# Patient Record
Sex: FEMALE | Race: WHITE | HISPANIC OR LATINO | URBAN - METROPOLITAN AREA
[De-identification: names, ages, dates, MRNs, and addresses within clinical notes are randomized per-mention and may not be internally consistent; named-entity substitution may affect disease eponyms.]

---

## 2017-02-07 ENCOUNTER — INPATIENT (INPATIENT)
Facility: HOSPITAL | Age: 37
LOS: 0 days | Discharge: HOME | DRG: 343 | End: 2017-02-08
Attending: SURGERY | Admitting: SURGERY
Payer: COMMERCIAL

## 2017-02-07 VITALS
DIASTOLIC BLOOD PRESSURE: 54 MMHG | WEIGHT: 123.02 LBS | TEMPERATURE: 97 F | HEART RATE: 97 BPM | HEIGHT: 65 IN | SYSTOLIC BLOOD PRESSURE: 91 MMHG | RESPIRATION RATE: 18 BRPM | OXYGEN SATURATION: 98 %

## 2017-02-07 DIAGNOSIS — Z98.82 BREAST IMPLANT STATUS: Chronic | ICD-10-CM

## 2017-02-07 DIAGNOSIS — K35.80 UNSPECIFIED ACUTE APPENDICITIS: ICD-10-CM

## 2017-02-07 DIAGNOSIS — Z98.891 HISTORY OF UTERINE SCAR FROM PREVIOUS SURGERY: Chronic | ICD-10-CM

## 2017-02-07 LAB
ACETONE SERPL-MCNC: NEGATIVE — SIGNIFICANT CHANGE UP
ALBUMIN SERPL ELPH-MCNC: 3.4 G/DL — LOW (ref 3.5–5)
ALP SERPL-CCNC: 65 U/L — SIGNIFICANT CHANGE UP (ref 40–120)
ALT FLD-CCNC: 22 U/L DA — SIGNIFICANT CHANGE UP (ref 10–60)
ANION GAP SERPL CALC-SCNC: 6 MMOL/L — SIGNIFICANT CHANGE UP (ref 5–17)
APTT BLD: 28.1 SEC — SIGNIFICANT CHANGE UP (ref 27.5–37.4)
AST SERPL-CCNC: 21 U/L — SIGNIFICANT CHANGE UP (ref 10–40)
BASOPHILS # BLD AUTO: 0 K/UL — SIGNIFICANT CHANGE UP (ref 0–0.2)
BASOPHILS NFR BLD AUTO: 0.7 % — SIGNIFICANT CHANGE UP (ref 0–2)
BILIRUB SERPL-MCNC: 0.2 MG/DL — SIGNIFICANT CHANGE UP (ref 0.2–1.2)
BUN SERPL-MCNC: 9 MG/DL — SIGNIFICANT CHANGE UP (ref 7–18)
CALCIUM SERPL-MCNC: 8.4 MG/DL — SIGNIFICANT CHANGE UP (ref 8.4–10.5)
CHLORIDE SERPL-SCNC: 106 MMOL/L — SIGNIFICANT CHANGE UP (ref 96–108)
CO2 SERPL-SCNC: 28 MMOL/L — SIGNIFICANT CHANGE UP (ref 22–31)
CREAT SERPL-MCNC: 0.8 MG/DL — SIGNIFICANT CHANGE UP (ref 0.5–1.3)
EOSINOPHIL # BLD AUTO: 0.1 K/UL — SIGNIFICANT CHANGE UP (ref 0–0.5)
EOSINOPHIL NFR BLD AUTO: 1.9 % — SIGNIFICANT CHANGE UP (ref 0–6)
GLUCOSE SERPL-MCNC: 96 MG/DL — SIGNIFICANT CHANGE UP (ref 70–99)
HCG SERPL-ACNC: <1 MIU/ML — SIGNIFICANT CHANGE UP
HCT VFR BLD CALC: 38.5 % — SIGNIFICANT CHANGE UP (ref 34.5–45)
HGB BLD-MCNC: 12.6 G/DL — SIGNIFICANT CHANGE UP (ref 11.5–15.5)
INR BLD: 1.27 RATIO — HIGH (ref 0.88–1.16)
LIDOCAIN IGE QN: 112 U/L — SIGNIFICANT CHANGE UP (ref 73–393)
LYMPHOCYTES # BLD AUTO: 1 K/UL — SIGNIFICANT CHANGE UP (ref 1–3.3)
LYMPHOCYTES # BLD AUTO: 16.1 % — SIGNIFICANT CHANGE UP (ref 13–44)
MCHC RBC-ENTMCNC: 29.9 PG — SIGNIFICANT CHANGE UP (ref 27–34)
MCHC RBC-ENTMCNC: 32.8 GM/DL — SIGNIFICANT CHANGE UP (ref 32–36)
MCV RBC AUTO: 91.3 FL — SIGNIFICANT CHANGE UP (ref 80–100)
MONOCYTES # BLD AUTO: 0.9 K/UL — SIGNIFICANT CHANGE UP (ref 0–0.9)
MONOCYTES NFR BLD AUTO: 14.7 % — HIGH (ref 2–14)
NEUTROPHILS # BLD AUTO: 4.3 K/UL — SIGNIFICANT CHANGE UP (ref 1.8–7.4)
NEUTROPHILS NFR BLD AUTO: 66.6 % — SIGNIFICANT CHANGE UP (ref 43–77)
PLATELET # BLD AUTO: 208 K/UL — SIGNIFICANT CHANGE UP (ref 150–400)
POTASSIUM SERPL-MCNC: 4.3 MMOL/L — SIGNIFICANT CHANGE UP (ref 3.5–5.3)
POTASSIUM SERPL-SCNC: 4.3 MMOL/L — SIGNIFICANT CHANGE UP (ref 3.5–5.3)
PROT SERPL-MCNC: 7.2 G/DL — SIGNIFICANT CHANGE UP (ref 6–8.3)
PROTHROM AB SERPL-ACNC: 14.3 SEC — HIGH (ref 10–13.1)
RBC # BLD: 4.22 M/UL — SIGNIFICANT CHANGE UP (ref 3.8–5.2)
RBC # FLD: 14.2 % — SIGNIFICANT CHANGE UP (ref 10.3–14.5)
SODIUM SERPL-SCNC: 140 MMOL/L — SIGNIFICANT CHANGE UP (ref 135–145)
WBC # BLD: 6.5 K/UL — SIGNIFICANT CHANGE UP (ref 3.8–10.5)
WBC # FLD AUTO: 6.5 K/UL — SIGNIFICANT CHANGE UP (ref 3.8–10.5)

## 2017-02-07 PROCEDURE — 74177 CT ABD & PELVIS W/CONTRAST: CPT | Mod: 26

## 2017-02-07 PROCEDURE — 99285 EMERGENCY DEPT VISIT HI MDM: CPT

## 2017-02-07 PROCEDURE — 44970 LAPAROSCOPY APPENDECTOMY: CPT

## 2017-02-07 PROCEDURE — 99253 IP/OBS CNSLTJ NEW/EST LOW 45: CPT | Mod: 57

## 2017-02-07 PROCEDURE — 88304 TISSUE EXAM BY PATHOLOGIST: CPT | Mod: 26

## 2017-02-07 PROCEDURE — 44970 LAPAROSCOPY APPENDECTOMY: CPT | Mod: AS

## 2017-02-07 RX ORDER — PANTOPRAZOLE SODIUM 20 MG/1
40 TABLET, DELAYED RELEASE ORAL
Qty: 0 | Refills: 0 | Status: DISCONTINUED | OUTPATIENT
Start: 2017-02-07 | End: 2017-02-08

## 2017-02-07 RX ORDER — ATAZANAVIR 200 MG/1
300 CAPSULE ORAL DAILY
Qty: 0 | Refills: 0 | Status: DISCONTINUED | OUTPATIENT
Start: 2017-02-07 | End: 2017-02-07

## 2017-02-07 RX ORDER — ENOXAPARIN SODIUM 100 MG/ML
40 INJECTION SUBCUTANEOUS EVERY 24 HOURS
Qty: 0 | Refills: 0 | Status: DISCONTINUED | OUTPATIENT
Start: 2017-02-07 | End: 2017-02-08

## 2017-02-07 RX ORDER — HYDROMORPHONE HYDROCHLORIDE 2 MG/ML
0.5 INJECTION INTRAMUSCULAR; INTRAVENOUS; SUBCUTANEOUS
Qty: 0 | Refills: 0 | Status: DISCONTINUED | OUTPATIENT
Start: 2017-02-07 | End: 2017-02-07

## 2017-02-07 RX ORDER — RITONAVIR 100 MG/1
100 TABLET, FILM COATED ORAL
Qty: 0 | Refills: 0 | Status: DISCONTINUED | OUTPATIENT
Start: 2017-02-07 | End: 2017-02-07

## 2017-02-07 RX ORDER — EFAVIRENZ, EMTRICITABINE AND TENOFOVIR DISOPROXIL FUMARATE 600; 200; 300 MG/1; MG/1; MG/1
1 TABLET, FILM COATED ORAL AT BEDTIME
Qty: 0 | Refills: 0 | Status: DISCONTINUED | OUTPATIENT
Start: 2017-02-07 | End: 2017-02-08

## 2017-02-07 RX ORDER — SODIUM CHLORIDE 9 MG/ML
1000 INJECTION, SOLUTION INTRAVENOUS
Qty: 0 | Refills: 0 | Status: DISCONTINUED | OUTPATIENT
Start: 2017-02-07 | End: 2017-02-08

## 2017-02-07 RX ORDER — ONDANSETRON 8 MG/1
4 TABLET, FILM COATED ORAL EVERY 6 HOURS
Qty: 0 | Refills: 0 | Status: DISCONTINUED | OUTPATIENT
Start: 2017-02-07 | End: 2017-02-08

## 2017-02-07 RX ORDER — ONDANSETRON 8 MG/1
4 TABLET, FILM COATED ORAL ONCE
Qty: 0 | Refills: 0 | Status: DISCONTINUED | OUTPATIENT
Start: 2017-02-07 | End: 2017-02-07

## 2017-02-07 RX ORDER — MORPHINE SULFATE 50 MG/1
2 CAPSULE, EXTENDED RELEASE ORAL EVERY 4 HOURS
Qty: 0 | Refills: 0 | Status: DISCONTINUED | OUTPATIENT
Start: 2017-02-07 | End: 2017-02-07

## 2017-02-07 RX ORDER — LEVOTHYROXINE SODIUM 125 MCG
150 TABLET ORAL DAILY
Qty: 0 | Refills: 0 | Status: DISCONTINUED | OUTPATIENT
Start: 2017-02-07 | End: 2017-02-08

## 2017-02-07 RX ORDER — SODIUM CHLORIDE 9 MG/ML
3 INJECTION INTRAMUSCULAR; INTRAVENOUS; SUBCUTANEOUS ONCE
Qty: 0 | Refills: 0 | Status: COMPLETED | OUTPATIENT
Start: 2017-02-07 | End: 2017-02-07

## 2017-02-07 RX ORDER — SODIUM CHLORIDE 9 MG/ML
1000 INJECTION INTRAMUSCULAR; INTRAVENOUS; SUBCUTANEOUS
Qty: 0 | Refills: 0 | Status: DISCONTINUED | OUTPATIENT
Start: 2017-02-07 | End: 2017-02-08

## 2017-02-07 RX ORDER — EFAVIRENZ, EMTRICITABINE AND TENOFOVIR DISOPROXIL FUMARATE 600; 200; 300 MG/1; MG/1; MG/1
1 TABLET, FILM COATED ORAL ONCE
Qty: 0 | Refills: 0 | Status: COMPLETED | OUTPATIENT
Start: 2017-02-07 | End: 2017-02-08

## 2017-02-07 RX ORDER — CEFOTETAN DISODIUM 1 G
1 VIAL (EA) INJECTION ONCE
Qty: 0 | Refills: 0 | Status: COMPLETED | OUTPATIENT
Start: 2017-02-07 | End: 2017-02-07

## 2017-02-07 RX ADMIN — HYDROMORPHONE HYDROCHLORIDE 0.5 MILLIGRAM(S): 2 INJECTION INTRAMUSCULAR; INTRAVENOUS; SUBCUTANEOUS at 19:46

## 2017-02-07 RX ADMIN — SODIUM CHLORIDE 3 MILLILITER(S): 9 INJECTION INTRAMUSCULAR; INTRAVENOUS; SUBCUTANEOUS at 12:06

## 2017-02-07 RX ADMIN — SODIUM CHLORIDE 200 MILLILITER(S): 9 INJECTION INTRAMUSCULAR; INTRAVENOUS; SUBCUTANEOUS at 12:06

## 2017-02-07 RX ADMIN — HYDROMORPHONE HYDROCHLORIDE 0.5 MILLIGRAM(S): 2 INJECTION INTRAMUSCULAR; INTRAVENOUS; SUBCUTANEOUS at 20:20

## 2017-02-07 NOTE — H&P ADULT. - PROBLEM SELECTOR PLAN 1
admit to surgery, Dr Vance to take the pt for a laparoscopic appendectomy tonight. informed consent signed. pre op labs ordered

## 2017-02-07 NOTE — H&P ADULT. - HISTORY OF PRESENT ILLNESS
36 year old female with hypothyroid and HIV+ pmhx presents c/o right sided abd pain x2 days. She has had some mild nausea and 1 episode of vomitting. She has had fevers at home up to 101.7*F. she also has a history of right sided ovarian cysts that have never given her problems in the past

## 2017-02-07 NOTE — ED PROVIDER NOTE - CHPI ED SYMPTOMS NEG
no diarrhea/no blood in stool/no vaginal bleeding, no vaginal discharge/no vomiting/no dysuria/no hematuria/no chills

## 2017-02-07 NOTE — ED PROVIDER NOTE - CARE PLAN
Principal Discharge DX:	Acute appendicitis Principal Discharge DX:	Acute appendicitis  Secondary Diagnosis:	Ovarian cyst

## 2017-02-07 NOTE — ED PROVIDER NOTE - OBJECTIVE STATEMENT
37 y/o F pt w/ PMHx of HIV and hypothyroid and PSHx of breast augmentation and  presents to the ED c/o low grade fever (Tmax 101F), chills and nausea x 2 days, and RLQ abdominal pain since yesterday. Pt describes sharp nonradiating pain that is constant and 7/10 in severity. Pt notes that she was told during her last pregnancy x 2 years ago that she had ovarian cysts. Pt states that her  sexually transmitted HIV to her and that they are currently sexually active w/o protection. Pt last had blood work done for HIV x 6 months ago and her CD4 was undetectable, as per pt. LMP: 2017. Last BM: yesterday morning. Pt denies chest pain, SOB, cough, vomiting, diarrhea, blood in stool, dysuria, hematuria, vaginal bleeding, vaginal discharge, or any other complaints. NKDA

## 2017-02-08 VITALS
TEMPERATURE: 99 F | HEART RATE: 89 BPM | SYSTOLIC BLOOD PRESSURE: 100 MMHG | DIASTOLIC BLOOD PRESSURE: 56 MMHG | RESPIRATION RATE: 18 BRPM | OXYGEN SATURATION: 100 %

## 2017-02-08 LAB
ANION GAP SERPL CALC-SCNC: 8 MMOL/L — SIGNIFICANT CHANGE UP (ref 5–17)
BUN SERPL-MCNC: 7 MG/DL — SIGNIFICANT CHANGE UP (ref 7–18)
CALCIUM SERPL-MCNC: 7.5 MG/DL — LOW (ref 8.4–10.5)
CHLORIDE SERPL-SCNC: 107 MMOL/L — SIGNIFICANT CHANGE UP (ref 96–108)
CO2 SERPL-SCNC: 24 MMOL/L — SIGNIFICANT CHANGE UP (ref 22–31)
CREAT SERPL-MCNC: 0.61 MG/DL — SIGNIFICANT CHANGE UP (ref 0.5–1.3)
GLUCOSE SERPL-MCNC: 85 MG/DL — SIGNIFICANT CHANGE UP (ref 70–99)
HCT VFR BLD CALC: 34.9 % — SIGNIFICANT CHANGE UP (ref 34.5–45)
HGB BLD-MCNC: 11 G/DL — LOW (ref 11.5–15.5)
MCHC RBC-ENTMCNC: 28.6 PG — SIGNIFICANT CHANGE UP (ref 27–34)
MCHC RBC-ENTMCNC: 31.4 GM/DL — LOW (ref 32–36)
MCV RBC AUTO: 91 FL — SIGNIFICANT CHANGE UP (ref 80–100)
PLATELET # BLD AUTO: 158 K/UL — SIGNIFICANT CHANGE UP (ref 150–400)
POTASSIUM SERPL-MCNC: 4 MMOL/L — SIGNIFICANT CHANGE UP (ref 3.5–5.3)
POTASSIUM SERPL-SCNC: 4 MMOL/L — SIGNIFICANT CHANGE UP (ref 3.5–5.3)
RBC # BLD: 3.84 M/UL — SIGNIFICANT CHANGE UP (ref 3.8–5.2)
RBC # FLD: 14 % — SIGNIFICANT CHANGE UP (ref 10.3–14.5)
SODIUM SERPL-SCNC: 139 MMOL/L — SIGNIFICANT CHANGE UP (ref 135–145)
WBC # BLD: 6.6 K/UL — SIGNIFICANT CHANGE UP (ref 3.8–10.5)
WBC # FLD AUTO: 6.6 K/UL — SIGNIFICANT CHANGE UP (ref 3.8–10.5)

## 2017-02-08 PROCEDURE — 99285 EMERGENCY DEPT VISIT HI MDM: CPT | Mod: 25

## 2017-02-08 PROCEDURE — 87086 URINE CULTURE/COLONY COUNT: CPT

## 2017-02-08 PROCEDURE — 87186 SC STD MICRODIL/AGAR DIL: CPT

## 2017-02-08 PROCEDURE — 82009 KETONE BODYS QUAL: CPT

## 2017-02-08 PROCEDURE — 80053 COMPREHEN METABOLIC PANEL: CPT

## 2017-02-08 PROCEDURE — 80048 BASIC METABOLIC PNL TOTAL CA: CPT

## 2017-02-08 PROCEDURE — 85027 COMPLETE CBC AUTOMATED: CPT

## 2017-02-08 PROCEDURE — 74177 CT ABD & PELVIS W/CONTRAST: CPT

## 2017-02-08 PROCEDURE — 85610 PROTHROMBIN TIME: CPT

## 2017-02-08 PROCEDURE — 84702 CHORIONIC GONADOTROPIN TEST: CPT

## 2017-02-08 PROCEDURE — 88304 TISSUE EXAM BY PATHOLOGIST: CPT

## 2017-02-08 PROCEDURE — 83690 ASSAY OF LIPASE: CPT

## 2017-02-08 PROCEDURE — 85730 THROMBOPLASTIN TIME PARTIAL: CPT

## 2017-02-08 RX ORDER — SODIUM CHLORIDE 9 MG/ML
500 INJECTION INTRAMUSCULAR; INTRAVENOUS; SUBCUTANEOUS ONCE
Qty: 0 | Refills: 0 | Status: COMPLETED | OUTPATIENT
Start: 2017-02-08 | End: 2017-02-08

## 2017-02-08 RX ORDER — KETOROLAC TROMETHAMINE 30 MG/ML
15 SYRINGE (ML) INJECTION EVERY 6 HOURS
Qty: 0 | Refills: 0 | Status: DISCONTINUED | OUTPATIENT
Start: 2017-02-08 | End: 2017-02-08

## 2017-02-08 RX ORDER — OXYCODONE HYDROCHLORIDE 5 MG/1
1 TABLET ORAL
Qty: 20 | Refills: 0 | OUTPATIENT
Start: 2017-02-08 | End: 2017-02-13

## 2017-02-08 RX ORDER — OXYCODONE HYDROCHLORIDE 5 MG/1
1 TABLET ORAL
Qty: 20 | Refills: 0
Start: 2017-02-08 | End: 2017-02-13

## 2017-02-08 RX ADMIN — Medication 15 MILLIGRAM(S): at 03:10

## 2017-02-08 RX ADMIN — EFAVIRENZ, EMTRICITABINE AND TENOFOVIR DISOPROXIL FUMARATE 1 TABLET(S): 600; 200; 300 TABLET, FILM COATED ORAL at 00:11

## 2017-02-08 RX ADMIN — ENOXAPARIN SODIUM 40 MILLIGRAM(S): 100 INJECTION SUBCUTANEOUS at 06:10

## 2017-02-08 RX ADMIN — Medication 150 MICROGRAM(S): at 06:10

## 2017-02-08 RX ADMIN — SODIUM CHLORIDE 250 MILLILITER(S): 9 INJECTION INTRAMUSCULAR; INTRAVENOUS; SUBCUTANEOUS at 03:12

## 2017-02-08 RX ADMIN — Medication 15 MILLIGRAM(S): at 03:30

## 2017-02-08 RX ADMIN — PANTOPRAZOLE SODIUM 40 MILLIGRAM(S): 20 TABLET, DELAYED RELEASE ORAL at 06:11

## 2017-02-08 NOTE — DISCHARGE NOTE ADULT - OTHER SIGNIFICANT FINDINGS
Patient ID: [id number] Patient Name: PADMINI DEWITT   YOB: 1980 Sex: F      EXAM: CT ABDOMEN AND PELVIS OC IC      PROCEDURE DATE: 02/07/2017      INTERPRETATION: CT of the abdomen and pelvis with IV contrast    Clinical Indication: Right lower quadrant abdominal pain and fever. History  of HIV.    Technique: Axial multidetector CT images of the abdomen and pelvis are  acquired following the administration of oral and IV contrast (95 cc  Omnipaque-350 administered, 5 cc discarded).    Comparison: None.    Findings:    Abdomen: Limited sections through the lung bases demonstrate a small left  lower lobe atelectasis. Small hypodense area in the left hepatic lobe  adjacent to the falciform ligament, likely due to focal fatty infiltration.  The gallbladder, pancreas, spleen, adrenals and the left kidney appear  unremarkable. There is a small hypodense lesion in the lower pole of the  right kidney, too small to characterize.    The proximal to mid appendix appears unremarkable. There is thickening and  abnormal mucosal enhancement at the appendiceal tip measuring up to 9 mm in  diameter with adjacent stranding, likely representing acute tip  appendicitis. The location of the distal appendix is posterior to the  ascending colon in the right retroperitoneum. No evidence for bowel  obstruction. No evidence for free air, ascites or enlarged lymph node.    Pelvis: The urinary bladder and uterus are within normal limits. There is a  2.1 cm hypodense lesion in the right adnexal region, suggestive of a right  ovarian cyst. Small pelvic free fluid, likely physiologic for age. The  sigmoid colon and rectum are grossly unremarkable. No enlarged pelvic lymph  node.    Impression: Acute tip appendicitis. The location of the distal appendix is  posterior to the ascending colon in the right retroperitoneum.    2.1 cm hypodense lesion in the right adnexal region, suggestive of a right  ovarian cyst.              DARCY BLACK M.D., ATTENDING RADIOLOGIST  This document has been electronically signed. Feb 7 2017 2:31PM

## 2017-02-08 NOTE — DISCHARGE NOTE ADULT - MEDICATION SUMMARY - MEDICATIONS TO TAKE
I will START or STAY ON the medications listed below when I get home from the hospital:    acetaminophen-oxyCODONE 325 mg-5 mg oral tablet  -- 1 tab(s) by mouth every 6 hours, As Needed, Moderate Pain MDD:4  -- Indication: For Prn Pain     lamiVUDine-zidovudine 150 mg-300 mg oral tablet  -- 1 tab(s) by mouth 2 times a day  -- Indication: For HIV     Norvir 100 mg oral capsule  -- 1 cap(s) by mouth once a day  -- Indication: For HIV     Reyataz 300 mg oral capsule  -- 1 cap(s) by mouth once a day  -- Indication: For HIV     Synthroid 150 mcg (0.15 mg) oral tablet  -- 1 tab(s) by mouth once a day  -- Indication: For Hypothyroidism I will START or STAY ON the medications listed below when I get home from the hospital:    acetaminophen-oxyCODONE 325 mg-5 mg oral tablet  -- 1 tab(s) by mouth every 6 hours, As Needed, Moderate Pain MDD:4  -- Indication: For prn pain     lamiVUDine-zidovudine 150 mg-300 mg oral tablet  -- 1 tab(s) by mouth 2 times a day  -- Indication: For HIV     Norvir 100 mg oral capsule  -- 1 cap(s) by mouth once a day  -- Indication: For HIV     Reyataz 300 mg oral capsule  -- 1 cap(s) by mouth once a day  -- Indication: For HIV     Synthroid 150 mcg (0.15 mg) oral tablet  -- 1 tab(s) by mouth once a day  -- Indication: For Hypothyroidism

## 2017-02-08 NOTE — DISCHARGE NOTE ADULT - HOSPITAL COURSE
36 year old female with hypothyroid and HIV+ pmhx presents c/o right sided abd pain x2 days. She has had some mild nausea and 1 episode of vomiting She has had fevers at home up to 101.7*F. she also has a history of right sided ovarian cysts that have never given her problems in the past. Patient was found to have acute appendicitis. Patient was taken to the OR on 2/7. Patient underwent laparoscopic appendectomy. Patient tolerated procedure well. Patients diet was advanced and tolerated. Patient for discharge home with follow u p with Dr. Vance

## 2017-02-08 NOTE — DISCHARGE NOTE ADULT - PLAN OF CARE
Wound healing Please follow up with Dr. Doshi within 1 week   No heavy lifting or straining Please continue current regimen and follow up with PCP

## 2017-02-08 NOTE — DISCHARGE NOTE ADULT - CARE PLAN
Principal Discharge DX:	Acute appendicitis, unspecified acute appendicitis type  Goal:	Wound healing  Instructions for follow-up, activity and diet:	Please follow up with Dr. Doshi within 1 week   No heavy lifting or straining  Secondary Diagnosis:	HIV (human immunodeficiency virus infection)  Goal:	Please continue current regimen and follow up with PCP  Secondary Diagnosis:	Hypothyroidism, unspecified type  Goal:	Please continue current regimen and follow up with PCP

## 2017-02-08 NOTE — DISCHARGE NOTE ADULT - CARE PROVIDER_API CALL
West Vance (MD), Surgery  [id number] [address] [place] [address]  Phone: [phone]  Fax: [phone]

## 2017-02-08 NOTE — DISCHARGE NOTE ADULT - PATIENT PORTAL LINK FT
“You can access the FollowHealth Patient Portal, offered by [facility], by registering with the following website: [email or web address]”

## 2017-02-09 ENCOUNTER — TRANSCRIPTION ENCOUNTER (OUTPATIENT)
Age: 37
End: 2017-02-09

## 2017-02-13 DIAGNOSIS — Z21 ASYMPTOMATIC HUMAN IMMUNODEFICIENCY VIRUS [HIV] INFECTION STATUS: ICD-10-CM

## 2017-02-13 DIAGNOSIS — R10.11 RIGHT UPPER QUADRANT PAIN: ICD-10-CM

## 2017-02-13 DIAGNOSIS — K35.80 UNSPECIFIED ACUTE APPENDICITIS: ICD-10-CM

## 2017-02-13 DIAGNOSIS — Z28.21 IMMUNIZATION NOT CARRIED OUT BECAUSE OF PATIENT REFUSAL: ICD-10-CM

## 2017-02-13 DIAGNOSIS — E03.9 HYPOTHYROIDISM, UNSPECIFIED: ICD-10-CM

## 2017-02-13 PROBLEM — Z00.00 ENCOUNTER FOR PREVENTIVE HEALTH EXAMINATION: Status: ACTIVE | Noted: 2017-02-13

## 2017-02-15 ENCOUNTER — APPOINTMENT (OUTPATIENT)
Dept: SURGERY | Facility: CLINIC | Age: 37
End: 2017-02-15

## 2017-02-15 VITALS
SYSTOLIC BLOOD PRESSURE: 108 MMHG | DIASTOLIC BLOOD PRESSURE: 54 MMHG | HEIGHT: 64 IN | WEIGHT: 120 LBS | TEMPERATURE: 97.8 F | BODY MASS INDEX: 20.49 KG/M2 | HEART RATE: 84 BPM

## 2017-02-15 DIAGNOSIS — B20 HUMAN IMMUNODEFICIENCY VIRUS [HIV] DISEASE: ICD-10-CM

## 2017-02-15 DIAGNOSIS — Z86.39 PERSONAL HISTORY OF OTHER ENDOCRINE, NUTRITIONAL AND METABOLIC DISEASE: ICD-10-CM

## 2017-02-15 DIAGNOSIS — K35.3 ACUTE APPENDICITIS WITH LOCALIZED PERITONITIS: ICD-10-CM

## 2017-02-15 RX ORDER — LEVOTHYROXINE SODIUM 0.17 MG/1
175 TABLET ORAL
Refills: 0 | Status: ACTIVE | COMMUNITY

## 2017-02-15 RX ORDER — EFAVIRENZ, EMTRICITABINE, AND TENOFOVIR DISOPROXIL FUMARATE 600; 200; 300 MG/1; MG/1; MG/1
TABLET, FILM COATED ORAL
Refills: 0 | Status: ACTIVE | COMMUNITY

## 2017-07-03 NOTE — PATIENT PROFILE ADULT. - AS SC BRADEN ACTIVITY
Quality 155 (Denominator): Falls Plan Of Care: Plan of Care not Documented, Reason not Otherwise Specified Name And Contact Information For Health Care Proxy: Sage Manuel Quality 110: Preventive Care And Screening: Influenza Immunization: Influenza Immunization previously received during influenza season Quality 154 Part A: Falls: Risk Assessment (Should Be Reported With Measure 155.): Falls risk assessment completed and documented in the past 12 months. Quality 131: Pain Assessment And Follow-Up: Pain assessment using a standardized tool is documented as negative, no follow-up plan required Quality 226: Preventive Care And Screening: Tobacco Use: Screening And Cessation Intervention: Patient screened for tobacco and never smoked Quality 47: Advance Care Plan: Advance Care Planning discussed and documented; advance care plan or surrogate decision maker documented in the medical record. Quality 111:Pneumonia Vaccination Status For Older Adults: Pneumococcal Vaccination Previously Received Quality 154 Part B: Falls: Risk Screening (Should Be Reported With Measure 155.): Patient screened for future fall risk; documentation of no falls in the past year or only one fall without injury in the past year Detail Level: Detailed Quality 431: Preventive Care And Screening: Unhealthy Alcohol Use - Screening: Patient screened for unhealthy alcohol use using a single question and scores less than 2 times per year (3) walks occasionally

## 2019-01-07 NOTE — PATIENT PROFILE ADULT. - NS PRO CONTRA REFUSE FLU INFO
Risks/benefits discussed with patient or patient surrogate details… detailed exam normal strength/no joint warmth/no calf tenderness/no joint erythema/no joint swelling/ROM intact

## 2019-02-25 NOTE — H&P ADULT. - VASCULAR
Detail Level: Zone Other (Free Text): Dr. PALMER advised patient about Renetta() doing a VI OR REVITALIZING PEEL twice a year, cost between $150-200 Equal and normal pulses (carotid, femoral, dorsalis pedis)

## 2019-05-11 ENCOUNTER — EMERGENCY (EMERGENCY)
Facility: HOSPITAL | Age: 39
LOS: 1 days | Discharge: HOME | End: 2019-05-11
Attending: EMERGENCY MEDICINE
Payer: COMMERCIAL

## 2019-05-11 VITALS
HEART RATE: 79 BPM | TEMPERATURE: 98 F | HEIGHT: 65 IN | WEIGHT: 130.07 LBS | SYSTOLIC BLOOD PRESSURE: 98 MMHG | OXYGEN SATURATION: 98 % | DIASTOLIC BLOOD PRESSURE: 56 MMHG | RESPIRATION RATE: 16 BRPM

## 2019-05-11 DIAGNOSIS — Z98.82 BREAST IMPLANT STATUS: Chronic | ICD-10-CM

## 2019-05-11 DIAGNOSIS — Z98.891 HISTORY OF UTERINE SCAR FROM PREVIOUS SURGERY: Chronic | ICD-10-CM

## 2019-05-11 PROCEDURE — 99284 EMERGENCY DEPT VISIT MOD MDM: CPT

## 2019-05-11 PROCEDURE — 99283 EMERGENCY DEPT VISIT LOW MDM: CPT

## 2019-05-11 RX ORDER — NEOMYCIN/POLYMYXIN B/HYDROCORT
1 SUSPENSION, DROPS(FINAL DOSAGE FORM)(ML) OTIC (EAR) ONCE
Refills: 0 | Status: COMPLETED | OUTPATIENT
Start: 2019-05-11 | End: 2019-05-11

## 2019-05-11 RX ADMIN — Medication 1 DROP(S): at 21:49

## 2019-05-11 NOTE — ED PROVIDER NOTE - ENMT, MLM
Airway patent, Nasal mucosa clear. Mouth with normal mucosa. Throat has no vesicles, no oropharyngeal exudates and uvula is midline. Right EAC moderate swelling and mild erythema, TM WNL

## 2019-05-11 NOTE — ED ADULT NURSE NOTE - OBJECTIVE STATEMENT
Humalog sliding scale, Lantus, Lipitor, Lasix, Synthroid, Deltasone
c/o right ear pain since yesterday ,worse today.

## 2019-05-11 NOTE — ED PROVIDER NOTE - NSFOLLOWUPCLINICS_GEN_ALL_ED_FT
[facility] - ENT  Otolaryngology (ENT)  [address] [place] [address]  Phone: [phone]  Fax:   Follow Up Time:

## 2019-05-12 ENCOUNTER — EMERGENCY (EMERGENCY)
Facility: HOSPITAL | Age: 39
LOS: 1 days | Discharge: HOME | End: 2019-05-12
Attending: EMERGENCY MEDICINE
Payer: COMMERCIAL

## 2019-05-12 ENCOUNTER — TRANSCRIPTION ENCOUNTER (OUTPATIENT)
Age: 39
End: 2019-05-12

## 2019-05-12 VITALS
DIASTOLIC BLOOD PRESSURE: 54 MMHG | RESPIRATION RATE: 16 BRPM | HEIGHT: 65 IN | WEIGHT: 130.07 LBS | TEMPERATURE: 99 F | SYSTOLIC BLOOD PRESSURE: 125 MMHG | OXYGEN SATURATION: 100 % | HEART RATE: 81 BPM

## 2019-05-12 DIAGNOSIS — Z98.891 HISTORY OF UTERINE SCAR FROM PREVIOUS SURGERY: Chronic | ICD-10-CM

## 2019-05-12 DIAGNOSIS — Z98.82 BREAST IMPLANT STATUS: Chronic | ICD-10-CM

## 2019-05-12 PROCEDURE — 99283 EMERGENCY DEPT VISIT LOW MDM: CPT

## 2019-05-12 PROCEDURE — 99284 EMERGENCY DEPT VISIT MOD MDM: CPT | Mod: 25

## 2019-05-12 PROCEDURE — 96372 THER/PROPH/DIAG INJ SC/IM: CPT

## 2019-05-12 RX ORDER — ACETAMINOPHEN 500 MG
975 TABLET ORAL ONCE
Refills: 0 | Status: COMPLETED | OUTPATIENT
Start: 2019-05-12 | End: 2019-05-12

## 2019-05-12 RX ORDER — KETOROLAC TROMETHAMINE 30 MG/ML
1 SYRINGE (ML) INJECTION
Qty: 15 | Refills: 0
Start: 2019-05-12 | End: 2019-05-16

## 2019-05-12 RX ORDER — ANTIPYRINE/BENZOCAINE 5.4 %-1.4%
1 DROPS OTIC (EAR)
Qty: 20 | Refills: 0
Start: 2019-05-12 | End: 2019-05-16

## 2019-05-12 RX ORDER — KETOROLAC TROMETHAMINE 30 MG/ML
30 SYRINGE (ML) INJECTION ONCE
Refills: 0 | Status: DISCONTINUED | OUTPATIENT
Start: 2019-05-12 | End: 2019-05-12

## 2019-05-12 RX ADMIN — Medication 975 MILLIGRAM(S): at 20:09

## 2019-05-12 RX ADMIN — Medication 30 MILLIGRAM(S): at 19:49

## 2019-05-12 NOTE — ED PROVIDER NOTE - PROGRESS NOTE DETAILS
relief with pain meds, will Pt is well appearing walking with steady gait, stable for discharge and follow up without fail with medical doctor. I had a detailed discussion with the patient and/or guardian regarding the historical points, exam findings, and any diagnostic results supporting the discharge diagnosis. Pt educated on care and need for follow up. Strict return instructions and red flag signs and symptoms discussed with patient. Questions answered. Pt shows understanding of discharge information and agrees to follow.

## 2019-05-12 NOTE — ED PROVIDER NOTE - OBJECTIVE STATEMENT
39 y/o F patient with a significant PMHx of HIV, Hypothyroid and a significant PSHx of breast augmentation and  returns to the ED with worsening right ear pain. Patient says she took Motrin and Tylenol to no relief. Patient says her last Tylenol dose was x3 hours ago and she was started on antibiotics yesterday. Patient denies any other complaints. NKDA.

## 2019-05-12 NOTE — ED ADULT NURSE NOTE - NSIMPLEMENTINTERV_GEN_ALL_ED
Implemented All Universal Safety Interventions:  [place] to call system. Call bell, personal items and telephone within reach. Instruct patient to call for assistance. Room bathroom lighting operational. Non-slip footwear when patient is off stretcher. Physically safe environment: no spills, clutter or unnecessary equipment. Stretcher in lowest position, wheels locked, appropriate side rails in place.

## 2019-05-12 NOTE — ED PROVIDER NOTE - ATTENDING CONTRIBUTION TO CARE
38 year old female c/o right ear pain, seen yesterday by myself and diagnosed with otitis externa however patient states pain is still persistent. PE: NAD, R EAC mild swelling, TM intact. I&P: otitis externa, continue abx, alternative analgesics given, ENT follow up

## 2019-05-13 RX ORDER — CIPROFLOXACIN AND DEXAMETHASONE 3; 1 MG/ML; MG/ML
4 SUSPENSION/ DROPS AURICULAR (OTIC)
Qty: 5 | Refills: 0
Start: 2019-05-13 | End: 2019-05-19

## 2019-05-14 NOTE — PATIENT PROFILE ADULT. - AS SC BRADEN SENSORY
Duration Of Freeze Thaw-Cycle (Seconds): 0 Render Post-Care Instructions In Note?: yes Detail Level: Detailed Consent: The patient's consent was obtained including but not limited to risks of crusting, scabbing, blistering, scarring, darker or lighter pigmentary change, recurrence, incomplete removal and infection. Render Note In Bullet Format When Appropriate: No Post-Care Instructions: I reviewed with the patient in detail post-care instructions. Patient is to wear sunprotection, and avoid picking at any of the treated lesions. Pt may apply Vaseline to crusted or scabbing areas.  Patient made aware that some lesions may take more than one treatment to fully resolve and some lesion may recur.  Patient was instructed to return to the office if lesions are not resolved after 2 months. (4) no impairment

## 2020-08-06 NOTE — ED ADULT NURSE NOTE - CAS EDN INTEG ASSESS
Appointment scheduled for tomorrow 8/7/2020
Pt is calling back and said the lump under her arm is much bigger, and she still has pain in her shoulder down to her elbow 
Pt notified
Pt was seen about 2 weeks ago and she has a lump under her right arm , right shoulder pain  She said the rash is going away but the pain is still there   Is this normal?
She should make a follow up apt for further evaluation  Thank you 
YES, if she really had shingles the pain can linger and be the last thing to go away  Worse case senario it never leaves but this is rare  Thank you 
WDL

## 2022-01-18 ENCOUNTER — EMERGENCY (EMERGENCY)
Facility: HOSPITAL | Age: 42
LOS: 1 days | Discharge: HOME | End: 2022-01-18
Attending: EMERGENCY MEDICINE
Payer: MEDICAID

## 2022-01-18 VITALS
TEMPERATURE: 98 F | RESPIRATION RATE: 18 BRPM | DIASTOLIC BLOOD PRESSURE: 73 MMHG | HEIGHT: 65 IN | WEIGHT: 147.93 LBS | SYSTOLIC BLOOD PRESSURE: 115 MMHG | OXYGEN SATURATION: 96 % | HEART RATE: 98 BPM

## 2022-01-18 VITALS
RESPIRATION RATE: 18 BRPM | OXYGEN SATURATION: 97 % | HEART RATE: 97 BPM | SYSTOLIC BLOOD PRESSURE: 116 MMHG | TEMPERATURE: 98 F | DIASTOLIC BLOOD PRESSURE: 75 MMHG

## 2022-01-18 DIAGNOSIS — Z98.82 BREAST IMPLANT STATUS: Chronic | ICD-10-CM

## 2022-01-18 DIAGNOSIS — Z98.891 HISTORY OF UTERINE SCAR FROM PREVIOUS SURGERY: Chronic | ICD-10-CM

## 2022-01-18 LAB
ALBUMIN SERPL ELPH-MCNC: 4 G/DL — SIGNIFICANT CHANGE UP (ref 3.5–5)
ALP SERPL-CCNC: 48 U/L — SIGNIFICANT CHANGE UP (ref 40–120)
ALT FLD-CCNC: 28 U/L DA — SIGNIFICANT CHANGE UP (ref 10–60)
ANION GAP SERPL CALC-SCNC: 6 MMOL/L — SIGNIFICANT CHANGE UP (ref 5–17)
AST SERPL-CCNC: 25 U/L — SIGNIFICANT CHANGE UP (ref 10–40)
BASOPHILS # BLD AUTO: 0.03 K/UL — SIGNIFICANT CHANGE UP (ref 0–0.2)
BASOPHILS NFR BLD AUTO: 0.4 % — SIGNIFICANT CHANGE UP (ref 0–2)
BILIRUB SERPL-MCNC: 0.2 MG/DL — SIGNIFICANT CHANGE UP (ref 0.2–1.2)
BUN SERPL-MCNC: 21 MG/DL — HIGH (ref 7–18)
CALCIUM SERPL-MCNC: 8.8 MG/DL — SIGNIFICANT CHANGE UP (ref 8.4–10.5)
CHLORIDE SERPL-SCNC: 108 MMOL/L — SIGNIFICANT CHANGE UP (ref 96–108)
CO2 SERPL-SCNC: 24 MMOL/L — SIGNIFICANT CHANGE UP (ref 22–31)
CREAT SERPL-MCNC: 1.04 MG/DL — SIGNIFICANT CHANGE UP (ref 0.5–1.3)
EOSINOPHIL # BLD AUTO: 0.49 K/UL — SIGNIFICANT CHANGE UP (ref 0–0.5)
EOSINOPHIL NFR BLD AUTO: 6.8 % — HIGH (ref 0–6)
GLUCOSE SERPL-MCNC: 97 MG/DL — SIGNIFICANT CHANGE UP (ref 70–99)
HCG SERPL-ACNC: <1 MIU/ML — SIGNIFICANT CHANGE UP
HCT VFR BLD CALC: 38.3 % — SIGNIFICANT CHANGE UP (ref 34.5–45)
HGB BLD-MCNC: 12.7 G/DL — SIGNIFICANT CHANGE UP (ref 11.5–15.5)
IMM GRANULOCYTES NFR BLD AUTO: 0.3 % — SIGNIFICANT CHANGE UP (ref 0–1.5)
LYMPHOCYTES # BLD AUTO: 1.68 K/UL — SIGNIFICANT CHANGE UP (ref 1–3.3)
LYMPHOCYTES # BLD AUTO: 23.3 % — SIGNIFICANT CHANGE UP (ref 13–44)
MCHC RBC-ENTMCNC: 30 PG — SIGNIFICANT CHANGE UP (ref 27–34)
MCHC RBC-ENTMCNC: 33.2 GM/DL — SIGNIFICANT CHANGE UP (ref 32–36)
MCV RBC AUTO: 90.3 FL — SIGNIFICANT CHANGE UP (ref 80–100)
MONOCYTES # BLD AUTO: 0.65 K/UL — SIGNIFICANT CHANGE UP (ref 0–0.9)
MONOCYTES NFR BLD AUTO: 9 % — SIGNIFICANT CHANGE UP (ref 2–14)
NEUTROPHILS # BLD AUTO: 4.34 K/UL — SIGNIFICANT CHANGE UP (ref 1.8–7.4)
NEUTROPHILS NFR BLD AUTO: 60.2 % — SIGNIFICANT CHANGE UP (ref 43–77)
NRBC # BLD: 0 /100 WBCS — SIGNIFICANT CHANGE UP (ref 0–0)
PLATELET # BLD AUTO: 255 K/UL — SIGNIFICANT CHANGE UP (ref 150–400)
POTASSIUM SERPL-MCNC: 4 MMOL/L — SIGNIFICANT CHANGE UP (ref 3.5–5.3)
POTASSIUM SERPL-SCNC: 4 MMOL/L — SIGNIFICANT CHANGE UP (ref 3.5–5.3)
PROT SERPL-MCNC: 8 G/DL — SIGNIFICANT CHANGE UP (ref 6–8.3)
RBC # BLD: 4.24 M/UL — SIGNIFICANT CHANGE UP (ref 3.8–5.2)
RBC # FLD: 15.8 % — HIGH (ref 10.3–14.5)
SARS-COV-2 RNA SPEC QL NAA+PROBE: SIGNIFICANT CHANGE UP
SODIUM SERPL-SCNC: 138 MMOL/L — SIGNIFICANT CHANGE UP (ref 135–145)
WBC # BLD: 7.21 K/UL — SIGNIFICANT CHANGE UP (ref 3.8–10.5)
WBC # FLD AUTO: 7.21 K/UL — SIGNIFICANT CHANGE UP (ref 3.8–10.5)

## 2022-01-18 PROCEDURE — 99284 EMERGENCY DEPT VISIT MOD MDM: CPT

## 2022-01-18 PROCEDURE — 80053 COMPREHEN METABOLIC PANEL: CPT

## 2022-01-18 PROCEDURE — 94640 AIRWAY INHALATION TREATMENT: CPT

## 2022-01-18 PROCEDURE — 99284 EMERGENCY DEPT VISIT MOD MDM: CPT | Mod: 25

## 2022-01-18 PROCEDURE — 71046 X-RAY EXAM CHEST 2 VIEWS: CPT

## 2022-01-18 PROCEDURE — 71046 X-RAY EXAM CHEST 2 VIEWS: CPT | Mod: 26

## 2022-01-18 PROCEDURE — 87635 SARS-COV-2 COVID-19 AMP PRB: CPT

## 2022-01-18 PROCEDURE — 36415 COLL VENOUS BLD VENIPUNCTURE: CPT

## 2022-01-18 PROCEDURE — 96374 THER/PROPH/DIAG INJ IV PUSH: CPT

## 2022-01-18 PROCEDURE — 85025 COMPLETE CBC W/AUTO DIFF WBC: CPT

## 2022-01-18 PROCEDURE — 96375 TX/PRO/DX INJ NEW DRUG ADDON: CPT

## 2022-01-18 PROCEDURE — 84702 CHORIONIC GONADOTROPIN TEST: CPT

## 2022-01-18 RX ORDER — MAGNESIUM SULFATE 500 MG/ML
1 VIAL (ML) INJECTION ONCE
Refills: 0 | Status: COMPLETED | OUTPATIENT
Start: 2022-01-18 | End: 2022-01-18

## 2022-01-18 RX ORDER — IPRATROPIUM/ALBUTEROL SULFATE 18-103MCG
3 AEROSOL WITH ADAPTER (GRAM) INHALATION ONCE
Refills: 0 | Status: COMPLETED | OUTPATIENT
Start: 2022-01-18 | End: 2022-01-18

## 2022-01-18 RX ORDER — DEXAMETHASONE 0.5 MG/5ML
6 ELIXIR ORAL ONCE
Refills: 0 | Status: COMPLETED | OUTPATIENT
Start: 2022-01-18 | End: 2022-01-18

## 2022-01-18 RX ADMIN — Medication 3 MILLILITER(S): at 20:29

## 2022-01-18 RX ADMIN — Medication 6 MILLIGRAM(S): at 20:29

## 2022-01-18 RX ADMIN — Medication 1 GRAM(S): at 21:27

## 2022-01-18 RX ADMIN — Medication 3 MILLILITER(S): at 21:27

## 2022-01-18 NOTE — ED PROVIDER NOTE - OBJECTIVE STATEMENT
41 y.o female with a PMHx of asthma and HIV with a cd4 count over 400 is coming in with shortness of breath and worsening dyspnea on exertion for x5 days. Patient states she went to the pmd earlier today and was referred to the ED for further evaluation. Patient is speaking in full sentences, is afebrile, and is sating at 96% on room air in the ED.

## 2022-01-18 NOTE — ED PROVIDER NOTE - CLINICAL SUMMARY MEDICAL DECISION MAKING FREE TEXT BOX
Patient with a history of asthma and bilateral wheezing found upon exam/ Patient states she was COVID-19 positive x3 weeks ago. Will repeat COVID-19 swab, treat asthma exacerbation, give steroids, and reassess.

## 2022-01-18 NOTE — ED PROVIDER NOTE - PATIENT PORTAL LINK FT
You can access the FollowMyHealth Patient Portal offered by [facility] by registering at the following website: [email or web address]. By joining [employer]’s FollowMyHealth portal, you will also be able to view your health information using other applications (apps) compatible with our system.

## 2022-01-18 NOTE — ED PROVIDER NOTE - NSFOLLOWUPINSTRUCTIONS_ED_ALL_ED_FT
Asthma, Adult       Asthma is a long-term (chronic) condition that causes recurrent episodes in which the airways become tight and narrow. The airways are the passages that lead from the nose and mouth down into the lungs. Asthma episodes, also called asthma attacks, can cause coughing, wheezing, shortness of breath, and chest pain. The airways can also fill with mucus. During an attack, it can be difficult to breathe. Asthma attacks can range from minor to life threatening.    Asthma cannot be cured, but medicines and lifestyle changes can help control it and treat acute attacks.      What are the causes?    This condition is believed to be caused by inherited (genetic) and environmental factors, but its exact cause is not known.    There are many things that can bring on an asthma attack or make asthma symptoms worse (triggers). Asthma triggers are different for each person. Common triggers include:  •Mold.      •Dust.      •Cigarette smoke.      •Cockroaches.      •Things that can cause allergy symptoms (allergens), such as animal dander or pollen from trees or grass.      •Air pollutants such as household , wood smoke, smog, or chemical odors.      •Cold air, weather changes, and winds (which increase molds and pollen in the air).      •Strong emotional expressions such as crying or laughing hard.      •Stress.      •Certain medicines (such as aspirin) or types of medicines (such as beta-blockers).      •Sulfites in foods and drinks. Foods and drinks that may contain sulfites include dried fruit, potato chips, and sparkling grape juice.      •Infections or inflammatory conditions such as the flu, a cold, or inflammation of the nasal membranes (rhinitis).      •Gastroesophageal reflux disease (GERD).      •Exercise or strenuous activity.        What are the signs or symptoms?    Symptoms of this condition may occur right after asthma is triggered or many hours later. Symptoms include:  •Wheezing. This can sound like whistling when you breathe.      •Excessive nighttime or early morning coughing.      •Frequent or severe coughing with a common cold.      •Chest tightness.      •Shortness of breath.      •Tiredness (fatigue) with minimal activity.        How is this diagnosed?    This condition is diagnosed based on:  •Your medical history.      •A physical exam.    •Tests, which may include:  •Lung function studies and pulmonary studies (spirometry). These tests can evaluate the flow of air in your lungs.      •Allergy tests.      •Imaging tests, such as X-rays.          How is this treated?    There is no cure for this condition, but treatment can help control your symptoms. Treatment for asthma usually involves:  •Identifying and avoiding your asthma triggers.    •Using medicines to control your symptoms. Generally, two types of medicines are used to treat asthma:  •Controller medicines. These help prevent asthma symptoms from occurring. They are usually taken every day.      •Fast-acting reliever or rescue medicines. These quickly relieve asthma symptoms by widening the narrow and tight airways. They are used as needed and provide short-term relief.        •Using supplemental oxygen. This may be needed during a severe episode.    •Using other medicines, such as:  •Allergy medicines, such as antihistamines, if your asthma attacks are triggered by allergens.      •Immune medicines (immunomodulators). These are medicines that help control the immune system.      •Creating an asthma action plan. An asthma action plan is a written plan for managing and treating your asthma attacks. This plan includes:  •A list of your asthma triggers and how to avoid them.      •Information about when medicines should be taken and when their dosage should be changed.      •Instructions about using a device called a peak flow meter. A peak flow meter measures how well the lungs are working and the severity of your asthma. It helps you monitor your condition.          Follow these instructions at home:    Controlling your home environment     Control your home environment in the following ways to help avoid triggers and prevent asthma attacks:  •Change your heating and air conditioning filter regularly.      •Limit your use of fireplaces and wood stoves.      •Get rid of pests (such as roaches and mice) and their droppings.      •Throw away plants if you see mold on them.      •Clean floors and dust surfaces regularly. Use unscented cleaning products.      •Try to have someone else vacuum for you regularly. Stay out of rooms while they are being vacuumed and for a short while afterward. If you vacuum, use a dust mask from a hardware store, a double-layered or microfilter vacuum  bag, or a vacuum  with a HEPA filter.      •Replace carpet with wood, tile, or vinyl randy. Carpet can trap dander and dust.      •Use allergy-proof pillows, mattress covers, and box spring covers.      •Keep your bedroom a trigger-free room.       •Avoid pets and keep windows closed when allergens are in the air.      •Wash beddings every week in hot water and dry them in a dryer.      •Use blankets that are made of polyester or cotton.      •Clean bathrooms and lashonda with bleach. If possible, have someone repaint the walls in these rooms with mold-resistant paint. Stay out of the rooms that are being cleaned and painted.      •Wash your hands often with soap and water. If soap and water are not available, use hand .      •Do not allow anyone to smoke in your home.      General instructions  •Take over-the-counter and prescription medicines only as told by your health care provider.  •Speak with your health care provider if you have questions about how or when to take the medicines.      •Make note if you are requiring more frequent dosages.        •Do not use any products that contain nicotine or tobacco, such as cigarettes and e-cigarettes. If you need help quitting, ask your health care provider. Also, avoid being exposed to secondhand smoke.      •Use a peak flow meter as told by your health care provider. Record and keep track of the readings.      •Understand and use the asthma action plan to help minimize, or stop an asthma attack, without needing to seek medical care.      •Make sure you stay up to date on your yearly vaccinations as told by your health care provider. This may include vaccines for the flu and pneumonia.      •Avoid outdoor activities when allergen counts are high and when air quality is low.      •Wear a ski mask that covers your nose and mouth during outdoor winter activities. Exercise indoors on cold days if you can.      •Warm up before exercising, and take time for a cool-down period after exercise.      •Keep all follow-up visits as told by your health care provider. This is important.        Where to find more information    •For information about asthma, turn to the Centers for Disease Control and Prevention at www.cdc.gov/asthma/faqs      •For air quality information, turn to [employer] at airnow.gov        Contact a health care provider if:    •You have wheezing, shortness of breath, or a cough even while you are taking medicine to prevent attacks.      •The mucus you cough up (sputum) is thicker than usual.      •Your sputum changes from clear or white to yellow, green, gray, or bloody.      •Your medicines are causing side effects, such as a rash, itching, swelling, or trouble breathing.      •You need to use a reliever medicine more than 2–3 times a week.      •Your peak flow reading is still at 50–79% of your personal best after following your action plan for 1 hour.      •You have a fever.        Get help right away if:    •You are getting worse and do not respond to treatment during an asthma attack.      •You are short of breath when at rest or when doing very little physical activity.      •You have difficulty eating, drinking, or talking.      •You have chest pain or tightness.      •You develop a fast heartbeat or palpitations.      •You have a bluish color to your lips or fingernails.      •You are light-headed or dizzy, or you faint.      •Your peak flow reading is less than 50% of your personal best.      •You feel too tired to breathe normally.        Summary    •Asthma is a long-term (chronic) condition that causes recurrent episodes in which the airways become tight and narrow. These episodes can cause coughing, wheezing, shortness of breath, and chest pain.      •Asthma cannot be cured, but medicines and lifestyle changes can help control it and treat acute attacks.      •Make sure you understand how to avoid triggers and how and when to use your medicines.      •Asthma attacks can range from minor to life threatening. Get help right away if you have an asthma attack and do not respond to treatment with your usual rescue medicines.      This information is not intended to replace advice given to you by your health care provider. Make sure you discuss any questions you have with your health care provider.      Document Revised: 09/17/2021 Document Reviewed: 04/21/2021    [employer] Patient Education © 2021 [employer]

## 2022-01-18 NOTE — ED PROVIDER NOTE - PROGRESS NOTE DETAILS
CXR negative, COVID negative.  pt reassessed and feels significantly better.  pt with still some mild wheezing, however feels good and wants to go home.  Will dc

## 2022-05-24 ENCOUNTER — EMERGENCY (EMERGENCY)
Facility: HOSPITAL | Age: 42
LOS: 1 days | Discharge: HOME | End: 2022-05-24
Attending: EMERGENCY MEDICINE
Payer: MEDICAID

## 2022-05-24 VITALS
HEIGHT: 65 IN | OXYGEN SATURATION: 95 % | TEMPERATURE: 97 F | SYSTOLIC BLOOD PRESSURE: 113 MMHG | RESPIRATION RATE: 20 BRPM | HEART RATE: 108 BPM | DIASTOLIC BLOOD PRESSURE: 78 MMHG

## 2022-05-24 VITALS — HEART RATE: 110 BPM

## 2022-05-24 DIAGNOSIS — Z98.82 BREAST IMPLANT STATUS: Chronic | ICD-10-CM

## 2022-05-24 DIAGNOSIS — Z98.891 HISTORY OF UTERINE SCAR FROM PREVIOUS SURGERY: Chronic | ICD-10-CM

## 2022-05-24 LAB
HCG UR QL: NEGATIVE — SIGNIFICANT CHANGE UP
RAPID RVP RESULT: DETECTED
RV+EV RNA SPEC QL NAA+PROBE: DETECTED
SARS-COV-2 RNA SPEC QL NAA+PROBE: SIGNIFICANT CHANGE UP

## 2022-05-24 PROCEDURE — 94640 AIRWAY INHALATION TREATMENT: CPT

## 2022-05-24 PROCEDURE — 81025 URINE PREGNANCY TEST: CPT

## 2022-05-24 PROCEDURE — 0225U NFCT DS DNA&RNA 21 SARSCOV2: CPT

## 2022-05-24 PROCEDURE — 99285 EMERGENCY DEPT VISIT HI MDM: CPT | Mod: 25

## 2022-05-24 PROCEDURE — 99284 EMERGENCY DEPT VISIT MOD MDM: CPT

## 2022-05-24 RX ORDER — IPRATROPIUM/ALBUTEROL SULFATE 18-103MCG
3 AEROSOL WITH ADAPTER (GRAM) INHALATION
Refills: 0 | Status: COMPLETED | OUTPATIENT
Start: 2022-05-24 | End: 2022-05-24

## 2022-05-24 RX ADMIN — Medication 3 MILLILITER(S): at 15:00

## 2022-05-24 RX ADMIN — Medication 3 MILLILITER(S): at 15:43

## 2022-05-24 RX ADMIN — Medication 3 MILLILITER(S): at 15:20

## 2022-05-24 RX ADMIN — Medication 40 MILLIGRAM(S): at 15:34

## 2022-05-24 NOTE — ED ADULT NURSE NOTE - NSIMPLEMENTINTERV_GEN_ALL_ED
Implemented All Universal Safety Interventions:  [place] to call system. Call bell, personal items and telephone within reach. Instruct patient to call for assistance. Room bathroom lighting operational. Non-slip footwear when patient is off stretcher. Physically safe environment: no spills, clutter or unnecessary equipment. Stretcher in lowest position, wheels locked, appropriate side rails in place.

## 2022-05-24 NOTE — ED PROVIDER NOTE - NSICDXPASTMEDICALHX_GEN_ALL_CORE_FT
PAST MEDICAL HISTORY:  Asthma     HIV (human immunodeficiency virus infection) VIRUS UNDETECTABLE    Hypothyroid

## 2022-05-24 NOTE — ED PROVIDER NOTE - OBJECTIVE STATEMENT
41 y.o. female LMP 10 days ago, HIV, last viral load undetected 3 mos ago, c/o dry cough, wheezing, sob, mild myalgia, no fever, n/v, CP, pt used neb/inhaler with mild relief.  Pt received COVID vaccine & booster.

## 2022-05-24 NOTE — ED ADULT NURSE NOTE - OBJECTIVE STATEMENT
patient presents to ED with c/o SOB and headache x2days. denies chest pain. patient O2 sat 96% on room air

## 2022-05-24 NOTE — ED PROVIDER NOTE - WET READ LAUNCH FT
PRE-SEDATION ASSESSMENT    CONSENT  Consent for procedure and sedation obtained: Yes    MEDICAL HISTORY  Significant medical/surgical history: No  Past Complications with Sedation/Anesthesia: No  Significant Family History: No  Smoking History: No  Alcohol/Drug abuse: No  Possible Pregnancy (LMP): Not Applicable  Cardiac History: No  Respiratory History: No    PHYSICAL EXAM  History and Physical Reviewed: H&P completed today  Airway Risk History: No previous complications  Airway Anatomy : Class II  Heart : Normal  Lungs : Normal  LOC/Mental Status : Normal    OTHER FINDINGS  Reviewed current medications and allergies: Yes  Pertinent lab/diagnostic test reviewed: Yes    SEDATION RISK ASSESSMENT  Risk Status ASA: Class II - Normal patient with mild systemic disease  Plan for Sedation: Moderate Sedation  Indications for Procedure/Pre-Procedure Diagnosis and Planned Procedure: Screening colonoscopy  EKG Monitoring: Yes    NARRATIVE FINDINGS     
There are no Wet Read(s) to document.

## 2022-05-24 NOTE — ED PROVIDER NOTE - PATIENT PORTAL LINK FT
You can access the FollowMyHealth Patient Portal offered by [facility] by registering at the following website: [email or web address]. By joining [employer]’s FollowMyHealth portal, you will also be able to view your health information using other applications (apps) compatible with our system.

## 2022-05-24 NOTE — ED PROVIDER NOTE - PROGRESS NOTE DETAILS
PF- 245 Pt's feeling much better, Lungs- clear, , will d/c home, advised to f/u with PMD pt states her B/P is usually 80/60.  tachycardia 2/2 Proventil treatment, will d/c home.  Pt's ambulating with no diff.

## 2022-06-27 ENCOUNTER — INPATIENT (INPATIENT)
Facility: HOSPITAL | Age: 42
LOS: 0 days | Discharge: HOME | DRG: 202 | End: 2022-06-28
Attending: INTERNAL MEDICINE | Admitting: INTERNAL MEDICINE
Payer: MEDICAID

## 2022-06-27 VITALS
HEART RATE: 102 BPM | DIASTOLIC BLOOD PRESSURE: 69 MMHG | TEMPERATURE: 98 F | WEIGHT: 139.99 LBS | HEIGHT: 65 IN | SYSTOLIC BLOOD PRESSURE: 100 MMHG | OXYGEN SATURATION: 95 % | RESPIRATION RATE: 18 BRPM

## 2022-06-27 DIAGNOSIS — E03.9 HYPOTHYROIDISM, UNSPECIFIED: ICD-10-CM

## 2022-06-27 DIAGNOSIS — Z98.82 BREAST IMPLANT STATUS: Chronic | ICD-10-CM

## 2022-06-27 DIAGNOSIS — Z29.9 ENCOUNTER FOR PROPHYLACTIC MEASURES, UNSPECIFIED: ICD-10-CM

## 2022-06-27 DIAGNOSIS — B20 HUMAN IMMUNODEFICIENCY VIRUS [HIV] DISEASE: ICD-10-CM

## 2022-06-27 DIAGNOSIS — J45.901 UNSPECIFIED ASTHMA WITH (ACUTE) EXACERBATION: ICD-10-CM

## 2022-06-27 DIAGNOSIS — Z98.891 HISTORY OF UTERINE SCAR FROM PREVIOUS SURGERY: Chronic | ICD-10-CM

## 2022-06-27 PROBLEM — J45.909 UNSPECIFIED ASTHMA, UNCOMPLICATED: Chronic | Status: ACTIVE | Noted: 2022-05-24

## 2022-06-27 LAB
ANION GAP SERPL CALC-SCNC: 9 MMOL/L — SIGNIFICANT CHANGE UP (ref 5–17)
BASOPHILS # BLD AUTO: 0.06 K/UL — SIGNIFICANT CHANGE UP (ref 0–0.2)
BASOPHILS NFR BLD AUTO: 0.7 % — SIGNIFICANT CHANGE UP (ref 0–2)
BUN SERPL-MCNC: 16 MG/DL — SIGNIFICANT CHANGE UP (ref 7–18)
CALCIUM SERPL-MCNC: 9.1 MG/DL — SIGNIFICANT CHANGE UP (ref 8.4–10.5)
CHLORIDE SERPL-SCNC: 106 MMOL/L — SIGNIFICANT CHANGE UP (ref 96–108)
CO2 SERPL-SCNC: 22 MMOL/L — SIGNIFICANT CHANGE UP (ref 22–31)
CREAT SERPL-MCNC: 0.91 MG/DL — SIGNIFICANT CHANGE UP (ref 0.5–1.3)
EGFR: 81 ML/MIN/1.73M2 — SIGNIFICANT CHANGE UP
EOSINOPHIL # BLD AUTO: 0.7 K/UL — HIGH (ref 0–0.5)
EOSINOPHIL NFR BLD AUTO: 7.9 % — HIGH (ref 0–6)
GLUCOSE SERPL-MCNC: 103 MG/DL — HIGH (ref 70–99)
HCG SERPL-ACNC: <1 MIU/ML — SIGNIFICANT CHANGE UP
HCT VFR BLD CALC: 36.8 % — SIGNIFICANT CHANGE UP (ref 34.5–45)
HGB BLD-MCNC: 12 G/DL — SIGNIFICANT CHANGE UP (ref 11.5–15.5)
IMM GRANULOCYTES NFR BLD AUTO: 0.2 % — SIGNIFICANT CHANGE UP (ref 0–1.5)
LYMPHOCYTES # BLD AUTO: 1.67 K/UL — SIGNIFICANT CHANGE UP (ref 1–3.3)
LYMPHOCYTES # BLD AUTO: 18.9 % — SIGNIFICANT CHANGE UP (ref 13–44)
MCHC RBC-ENTMCNC: 29.9 PG — SIGNIFICANT CHANGE UP (ref 27–34)
MCHC RBC-ENTMCNC: 32.6 GM/DL — SIGNIFICANT CHANGE UP (ref 32–36)
MCV RBC AUTO: 91.5 FL — SIGNIFICANT CHANGE UP (ref 80–100)
MONOCYTES # BLD AUTO: 0.78 K/UL — SIGNIFICANT CHANGE UP (ref 0–0.9)
MONOCYTES NFR BLD AUTO: 8.8 % — SIGNIFICANT CHANGE UP (ref 2–14)
NEUTROPHILS # BLD AUTO: 5.6 K/UL — SIGNIFICANT CHANGE UP (ref 1.8–7.4)
NEUTROPHILS NFR BLD AUTO: 63.5 % — SIGNIFICANT CHANGE UP (ref 43–77)
NRBC # BLD: 0 /100 WBCS — SIGNIFICANT CHANGE UP (ref 0–0)
PLATELET # BLD AUTO: 296 K/UL — SIGNIFICANT CHANGE UP (ref 150–400)
POTASSIUM SERPL-MCNC: 4.3 MMOL/L — SIGNIFICANT CHANGE UP (ref 3.5–5.3)
POTASSIUM SERPL-SCNC: 4.3 MMOL/L — SIGNIFICANT CHANGE UP (ref 3.5–5.3)
RBC # BLD: 4.02 M/UL — SIGNIFICANT CHANGE UP (ref 3.8–5.2)
RBC # FLD: 14.1 % — SIGNIFICANT CHANGE UP (ref 10.3–14.5)
SARS-COV-2 RNA SPEC QL NAA+PROBE: SIGNIFICANT CHANGE UP
SODIUM SERPL-SCNC: 137 MMOL/L — SIGNIFICANT CHANGE UP (ref 135–145)
WBC # BLD: 8.83 K/UL — SIGNIFICANT CHANGE UP (ref 3.8–10.5)
WBC # FLD AUTO: 8.83 K/UL — SIGNIFICANT CHANGE UP (ref 3.8–10.5)

## 2022-06-27 PROCEDURE — 99285 EMERGENCY DEPT VISIT HI MDM: CPT

## 2022-06-27 PROCEDURE — 71045 X-RAY EXAM CHEST 1 VIEW: CPT | Mod: 26

## 2022-06-27 RX ORDER — ALBUTEROL 90 UG/1
1 AEROSOL, METERED ORAL EVERY 4 HOURS
Refills: 0 | Status: DISCONTINUED | OUTPATIENT
Start: 2022-06-27 | End: 2022-06-28

## 2022-06-27 RX ORDER — LEVOTHYROXINE SODIUM 125 MCG
112 TABLET ORAL DAILY
Refills: 0 | Status: DISCONTINUED | OUTPATIENT
Start: 2022-06-27 | End: 2022-06-28

## 2022-06-27 RX ORDER — IPRATROPIUM/ALBUTEROL SULFATE 18-103MCG
3 AEROSOL WITH ADAPTER (GRAM) INHALATION ONCE
Refills: 0 | Status: COMPLETED | OUTPATIENT
Start: 2022-06-27 | End: 2022-06-27

## 2022-06-27 RX ORDER — BICTEGRAVIR SODIUM, EMTRICITABINE, AND TENOFOVIR ALAFENAMIDE FUMARATE 30; 120; 15 MG/1; MG/1; MG/1
1 TABLET ORAL DAILY
Refills: 0 | Status: DISCONTINUED | OUTPATIENT
Start: 2022-06-27 | End: 2022-06-28

## 2022-06-27 RX ORDER — BICTEGRAVIR SODIUM, EMTRICITABINE, AND TENOFOVIR ALAFENAMIDE FUMARATE 30; 120; 15 MG/1; MG/1; MG/1
1 TABLET ORAL
Qty: 0 | Refills: 0 | DISCHARGE

## 2022-06-27 RX ORDER — LEVOTHYROXINE SODIUM 125 MCG
1 TABLET ORAL
Qty: 0 | Refills: 0 | DISCHARGE

## 2022-06-27 RX ORDER — MONTELUKAST 4 MG/1
10 TABLET, CHEWABLE ORAL AT BEDTIME
Refills: 0 | Status: DISCONTINUED | OUTPATIENT
Start: 2022-06-27 | End: 2022-06-28

## 2022-06-27 RX ORDER — MAGNESIUM SULFATE 500 MG/ML
2 VIAL (ML) INJECTION ONCE
Refills: 0 | Status: COMPLETED | OUTPATIENT
Start: 2022-06-27 | End: 2022-06-27

## 2022-06-27 RX ADMIN — ALBUTEROL 1 PUFF(S): 90 AEROSOL, METERED ORAL at 22:24

## 2022-06-27 RX ADMIN — BICTEGRAVIR SODIUM, EMTRICITABINE, AND TENOFOVIR ALAFENAMIDE FUMARATE 1 TABLET(S): 30; 120; 15 TABLET ORAL at 13:02

## 2022-06-27 RX ADMIN — Medication 3 MILLILITER(S): at 04:31

## 2022-06-27 RX ADMIN — Medication 3 MILLILITER(S): at 04:07

## 2022-06-27 RX ADMIN — ALBUTEROL 1 PUFF(S): 90 AEROSOL, METERED ORAL at 18:28

## 2022-06-27 RX ADMIN — Medication 150 GRAM(S): at 05:10

## 2022-06-27 RX ADMIN — Medication 112 MICROGRAM(S): at 13:02

## 2022-06-27 RX ADMIN — Medication 125 MILLIGRAM(S): at 05:06

## 2022-06-27 RX ADMIN — ALBUTEROL 1 PUFF(S): 90 AEROSOL, METERED ORAL at 17:12

## 2022-06-27 RX ADMIN — Medication 60 MILLIGRAM(S): at 04:31

## 2022-06-27 NOTE — PATIENT PROFILE ADULT - FALL HARM RISK - UNIVERSAL INTERVENTIONS
Bed in lowest position, wheels locked, appropriate side rails in place/Call bell, personal items and telephone in reach/Instruct patient to call for assistance before getting out of bed or chair/Non-slip footwear when patient is out of bed/[place] to call system/Physically safe environment - no spills, clutter or unnecessary equipment/Purposeful Proactive Rounding/Room/bathroom lighting operational, light cord in reach

## 2022-06-27 NOTE — ED PROVIDER NOTE - CLINICAL SUMMARY MEDICAL DECISION MAKING FREE TEXT BOX
Pt with persistent wheezing, no respiratory distress. Will admit for standing orders nebulizer and respiratory monitoring.  I had a detailed discussion with the patient and/or guardian regarding the historical points, exam findings, and any diagnostic results supporting the admit diagnosis.

## 2022-06-27 NOTE — ED ADULT NURSE NOTE - NSIMPLEMENTINTERV_GEN_ALL_ED
Implemented All Universal Safety Interventions:  [place] to call system. Call bell, personal items and telephone within reach. Instruct patient to call for assistance. Room bathroom lighting operational. Non-slip footwear when patient is off stretcher. Physically safe environment: no spills, clutter or unnecessary equipment. Stretcher in lowest position, wheels locked, appropriate side rails in place.

## 2022-06-27 NOTE — H&P ADULT - PROBLEM SELECTOR PLAN 2
- h/o HIV, on Biktarvy  - outpt viral load (one week ago) undetectable  - c/w home meds - h/o hypothyroidism, no synthroid  - c/w home meds

## 2022-06-27 NOTE — ED ADULT NURSE NOTE - ED STAT RN HANDOFF DETAILS
report given to RILEY Lino for cont. care, pt well rested feels better after treatment, pt admitted for asthma exacerbation.

## 2022-06-27 NOTE — ED PROVIDER NOTE - OBJECTIVE STATEMENT
Chief complaint of SOB/wheezing x 3 days. Minimal relief with rescue inhaler.   no fever, no chest pain  no hx of intubations or recent hospitalizations   PE + diffuse wheezing and coughing, Chief complaint of SOB/wheezing x 3 days. Minimal relief with rescue inhaler.   no fever, no chest pain  no hx of intubations or recent hospitalizations

## 2022-06-27 NOTE — CONSULT NOTE ADULT - SUBJECTIVE AND OBJECTIVE BOX
PULMONARY CONSULT NOTE      PADMINI VILLANUEVA  MRN-[id number]      HISTORY OF PRESENT ILLNESS: 40 y/o  female with pmh of hypothyroidism, HIV+ and Asthma, admitted with cough, wheezing and sob. Awake, alert, laying in bed in NAD    MEDICATIONS  (STANDING):  bictegravir 50 mG/emtricitabine 200 mG/tenofovir alafenamide 25 mG (BIKTARVY) 1 Tablet(s) Oral daily  levothyroxine 112 MICROGram(s) Oral daily      MEDICATIONS  (PRN):      Allergies    No Known Allergies    Intolerances        PAST MEDICAL & SURGICAL HISTORY:  Hypothyroid      HIV (human immunodeficiency virus infection)  VIRUS UNDETECTABLE      Asthma      S/P       S/P breast augmentation          FAMILY HISTORY:      SOCIAL HISTORY  Smoking History:     REVIEW OF SYSTEMS:    CONSTITUTIONAL:  No fevers, chills, sweats    HEENT:  Eyes:  No diplopia or blurred vision. ENT:  No earache, sore throat or runny nose.    CARDIOVASCULAR:  No pressure, squeezing, tightness, or heaviness about the chest; no palpitations.    RESPIRATORY:  Per HPI    GASTROINTESTINAL:  No abdominal pain, nausea, vomiting or diarrhea.    GENITOURINARY:  No dysuria, frequency or urgency.    NEUROLOGIC:  No paresthesias, fasciculations, seizures or weakness.    PSYCHIATRIC:  No disorder of thought or mood.    Vital Signs Last 24 Hrs  T(C): 36.9 (2022 07:09), Max: 36.9 (2022 07:09)  T(F): 98.4 (2022 07:09), Max: 98.4 (2022 07:09)  HR: 100 (2022 07:09) (90 - 102)  BP: 113/78 (2022 07:09) (100/69 - 113/78)  BP(mean): --  RR: 17 (2022 07:09) (17 - 18)  SpO2: 95% (2022 07:09) (94% - 95%)  I&O's Detail      PHYSICAL EXAMINATION:    GENERAL: The patient is a well-developed, well-nourished _____in no apparent distress.     HEENT: Head is normocephalic and atraumatic. Extraocular muscles are intact. Mucous membranes are moist.     NECK: Supple.     LUNGS: +Wheezing    HEART: Regular rate and rhythm without murmur.    ABDOMEN: Soft, nontender, and nondistended.  No hepatosplenomegaly is noted.    EXTREMITIES: Without any cyanosis, clubbing, rash, lesions or edema.    NEUROLOGIC: Grossly intact.      LABS:                        12.0   8.83  )-----------( 296      ( 2022 05:07 )             36.8         137  |  106  |  16  ----------------------------<  103<H>  4.3   |  22  |  0.91    Ca    9.1      2022 05:07                          MICROBIOLOGY:    RADIOLOGY & ADDITIONAL STUDIES:    CXR:  x  Ct scan chest;    ekg;    echo:

## 2022-06-27 NOTE — H&P ADULT - ATTENDING COMMENTS
41F, w/ PMH of HIV, asthma, and hypothyroidism, presents with SOB for 3 days. She reports associated increased coughing and wheezing at rest, but denies sputum production. She denies fever, chills, n/v, chest pain, abdominal pain, urinary or bowel movement changes.      assessment  --  acute asthma exacerb,  h/o HIV, asthma, and hypothyroidism    plan  --  admit to med, atrov and prov nebs, supplemental oxygen, solumedrol, cont preadmit home meds, gi and dvt prophylaxis,   cbc, bmp, mg, phos, lipids, tsh,      id cons  pulm cons

## 2022-06-27 NOTE — H&P ADULT - PROBLEM SELECTOR PLAN 3
- DVT: lovenox - h/o HIV, on Biktarvy  - outpt viral load (one week ago) undetectable  - c/w home meds

## 2022-06-27 NOTE — H&P ADULT - HISTORY OF PRESENT ILLNESS
Patient is a 41F, w/ PMH of HIV, asthma, and hypothyroidism, presents with SOB for 3 days. She reports associated increased coughing and wheezing at rest, but denies sputum production. She denies fever, chills, n/v, chest pain, abdominal pain, urinary or bowel movement changes.

## 2022-06-27 NOTE — H&P ADULT - ASSESSMENT
Patient is a 41F, w/ PMH of HIV, asthma, and hypothyroidism, presents with SOB for 3 days. Admitted for asthma exac

## 2022-06-27 NOTE — PATIENT PROFILE ADULT - NSPROPTRIGHTBILLOFRIGHTS_GEN_A_NUR
+h/o Coarctation of aorta, repaired DOL#7.  Follows with cardiologist at [facility], Dr. Caren Wright. 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 No interconnecting vein. The surgical site shows no recoarctation". Child is due for f/u at 3 years of age. Consult from January 2017 attached.   Denies any s/s of cardiac origin. PO feeding small amounts. Soft diet. Child also tolerates some liquids via sippy cup.    Mother reports it is difficult to feed her by mouth and she is receiving feeding therapy 3 times a week.   14Fr. 1.2cm Mini G-tube in place.   Tolerating tube feeds of: Peptamen Jr 5oz [id number], at rate of 65ml-75ml/hour. Mother will run slower if needed due to fear of vomiting.  +Colostomy site intact. +green stool.  s/p PSARP November 2016.    G button removed 6 months ago but mother has noticed drainage from the site of the G tube.  No w here none Denies any recent fever or s/s illness patient Follows with orthopedist at [facility] spine deformity. Denies history of seizure or concussion s/p vesicostomy creation November 2016. ear tubes placed along with cleft palate repair April 2017 at [facility]. Denies any h/o RAD or wheezing. s/p vesicostomy creation 2016 with Dr. Fam. At the time of the PSARP she had ischemia of the introitus so a vesicostomy was created. At the time of the colostomy reversal, the vesicostomy was converted to a tube cystostomy. She was weaned off the suprapubic tube and it was eventually removed 1/18/2018. +h/o Coarctation of aorta, repaired DOL#7.  Follows with cardiologist at [facility], Dr. Ogden. Most recent visit was 21.  EKG: normal rate, rhythm, intervals and voltages for age.  ECHO: s/p  repair. No residual coarctation of the aorta.  Normal aortic valve, normal aortic dimensions, normal biventricular function and no effusions. left superior vena cava to coronary sinus connection, which per the note will not affect her hemodynamically (the systemic venous blood still drains to the right atrium, as it should. She does not require SBE prophylaxis- she is to follow up with cardiology yearly. Follows with orthopedist at [facility]  for spine deformity. 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 She has a history of  coarctation of the aorta which was repaired on DOL # 7 at [facility]. She had a cleft palate repair  4/2017. 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 Fam. She had ischemia of the introitus so a vesicostomy was created. At the time of the colostomy reversal, the vesicostomy was converted to a tube cystostomy. She was weaned off the suprapubic tube and it was eventually removed 1/18/2018. She had a G Tube which was removed by Dr. Lazo in the office 4/14/2021. She has had leaking from the site daily and is now here prior to cauterization of the tract.  She has consistently had problems with constipation and incontinence so she will undergo a contrast enema during the procedure.  She eats a soft diet.

## 2022-06-27 NOTE — H&P ADULT - PROBLEM SELECTOR PLAN 1
- h/o asthma and asthma exac  - p/w increasing SOB and wheezing  - RVP enterovirus  - s/p duoneb, steroid, and mag  - c/w duoneb and steroid - h/o asthma and asthma exac  - p/w increasing SOB and wheezing  - RVP enterovirus in may 2022  - s/p duoneb, steroid, and mag  - c/w duoneb and steroid - h/o asthma and asthma exac  - p/w increasing SOB and wheezing  - RVP enterovirus in may 2022  - s/p duoneb, steroid, and mag  - c/w albuterol prn and IV steroid

## 2022-06-27 NOTE — ED ADULT NURSE NOTE - NSFALLRSKUNASSIST_ED_ALL_ED
[place]  Progress Note - Huston Mcardle 1946, 76 y o  male MRN: [id number]  Unit/Bed#: -01 Encounter: [id number]  Primary Care Provider: José Antonio Huynh DO   Date and time admitted to hospital: 5/2/2021  2:28 PM    Pneumonia due to COVID-19 virus  Assessment & Plan  · Covid + on 4/6  · Has had two admissions since, 4/6 to 4/10 and 4/15 to 4/20  CXR GGO   CTA neg PE worsening consolidation/b/l pleural effusions  severe COVID-19 tx algorithm/Positive 4/6  IS  Self prone, OOB  MEDICATIONS  Solumedrol 125mg q 6hrs D3/3, transitioned to solumedrol 60 mg BID 5/15  Levaquin completed 5/8  Bactrim D7 given concern for PCP pna   Atorvastatin home dose   A/C held due to [facility] on bronch 5/15  Completed Remdesivir 6 doses   Plasma 5/7/21  Actemra na  INFLAMMATORY MARKERS (trend q 48-72)  CRP 15--from [id number]  Ferritin 331--peak 529  D-Dimer 3 2---peak7 9  Troponin 0 05  BNP 6500 from [id number]  CK 24  PCT 0 13    * Acute respiratory failure with hypoxia (HCC)  Assessment & Plan  · Secondary to COVID-19 versus PCP PNA vs fibrosis  · Admit 5/2 transferred on 05/12 for high-flow nasal cannula   · CTA -PE, worsening bilateral infiltrates and pleural effusions  · Recently had PET scan completed an outpatient which shows no lymph nodes eliminated but reactive inflammatory response versus tumors  · ID consulted and appreciate recs  · Solumedrol 125mg q 6hrs completed D3/3 on 5/14  · Continue solumedrol 60 mg q12 hr d#4  · Bactrim D7  · 5/8 levaquin completed  · Lasix BID   · Currently on NC 4 L, keep O2 sats 88% or greater  · S/p Bronch 5/15 revealing alveolar hemorrhage, pending further study results from obtained samples   · Holding A/C  · Plan to repeat bronch later this week    Pulmonary alveolar hemorrhage  Assessment & Plan  · S/p Bronch 5/15 - imaging available in media   · Pending results from samples obtained   · S/p 1 units PRBC on 5/15 due to Hgb drop   · 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, fibrinogen was 211, INR WNL, D-dimer trending down, Plt WNL  · Intermittent hemoptysis   · AC & DVT prophylaxis held  · Plan to repeat bronch this week     Leukocytosis  Assessment & Plan  · Likely reactive due to steroid use   · Noted 1 band on CBC with diff 5/14  · Trend CBC & temp   · WBC trending down  · ID following      Acute on chronic blood loss anemia  Assessment & Plan  · Baseline 10  · Bronch - alveolar hemorrhage on 5/15   · S/p 1 unit PRBC 5/15  · Hgb improved, stable  · Cont home iron  · Transfuse for Hgb < 7 0     · Trend Hgb daily & PRN     Essential hypertension  Assessment & Plan  · Continue Toprol and lasix BID     Type 2 diabetes mellitus without complication, without long-term current use of insulin [facility])  Assessment & Plan  Lab Results   Component Value Date    HGBA1C 7 4 (H) 05/13/2021       Recent Labs     05/17/21  0705 05/17/21  1111 05/17/21  1601 05/17/21  2114   POCGLU 246* 320* 188* 140       Blood Sugar Average: Last 72 hrs:  (P) 354 [id number]     · Holding home metformin  · Continue SSI,  Currently on algorithm 4   · 3 units TID with meals    · Goal glucose < 180       Gastroesophageal reflux disease with esophagitis  Assessment & Plan  · Continue Home PPI      Paroxysmal atrial fibrillation ([address] )  Assessment & Plan  · Patient is on Eliquis as outpatient   · Rate controlled on Lopressor  · Stopped AC & DVT prophylaxis due to alveolar hemorrhage     Chronic diastolic congestive heart failure (HCC)  Assessment & Plan  Wt Readings from Last 3 Encounters:   05/17/21 70 1 kg (154 lb 8 7 oz)   04/20/21 73 2 kg (161 lb 6 oz)   04/10/21 76 8 kg (169 lb 5 oz)       · Echocardiogram shows EF 40% diffuse hypokinesis bio AVR from EF 60% in 2019  · BNP 11000--down to 6500  · Continue Lasix 40mg IV BID   · Fio2 requirements decreasing   · CTA b/l Pleural effusions greater on right  · Goal negative 500 - 1 L daily  · I/o monitoring   · Daily weight      Lymphoma [facility])  Assessment & Plan  · Follows with Dr Rogers Mayes at [facility] [phone]  · Patient is considering lung biopsy, however he is currently not stable enough to undergo biopsy  · PET scan done at [facility] concerning for limiting areas in question of possibility to of reactive inflammatory response versus tumors  · ----PET SCAN  POST COVID DIAGNOSIS ??? SIGNIFICANCE  · Patient will need outpatient follow-up with oncologist  · Received IVIG on   · Last chemo    · S/p Bronch 5/15 - noted alveolar hemorrhage - pending serology including ANCA, PCP, flow cytometry and cytology and cell count    Coronary disease  Assessment & Plan  · Status post CABG  · Continue aspirin, statin, beta-blocker,   · Holding ACEi due to aggressive diuretics        ----------------------------------------------------------------------------------------  HPI/24hr events: No issues overnight    Disposition: Transfer to Med-Surg   Code Status: Level 1 - Full Code  ---------------------------------------------------------------------------------------  SUBJECTIVE  Denies pain or SOB, no complaints    Review of Systems   All other systems reviewed and are negative       ---------------------------------------------------------------------------------------  OBJECTIVE    Vitals   Vitals:    21 1113 21 1511 21 1901 21 2245   BP: 118/58 123/60 116/58 118/58   BP Location: Left arm Right arm Left arm Left arm   Pulse: (!) 109 84 84 62   Resp: (!) 26 19 (!) 28 19   Temp: 97 8 °F (36 6 °C) 97 7 °F (36 5 °C) 97 7 °F (36 5 °C) 97 5 °F (36 4 °C)   TempSrc: Oral Oral Oral Axillary   SpO2: 97% 99% 97% 96%   Weight:       Height:         Temp (24hrs), Av 5 °F (36 4 °C), Min:97 1 °F (36 2 °C), Max:97 8 °F (36 6 °C)  Current: Temperature: 97 5 °F (36 4 °C)          Respiratory:  SpO2: SpO2: 96 %  Nasal Cannula O2 Flow Rate (L/min): 3 L/min      Physical Exam  Vitals signs and nursing note reviewed  Constitutional:       Appearance: Normal appearance  Comments: Elderly male, OOB in chair on 4L NC  Chronically ill appearing   HENT:      Head: Normocephalic and atraumatic  Nose: Nose normal       Mouth/Throat:      Mouth: Mucous membranes are moist       Pharynx: Oropharynx is clear  Eyes:      Extraocular Movements: Extraocular movements intact  Conjunctiva/sclera: Conjunctivae normal       Pupils: Pupils are equal, round, and reactive to light  Neck:      Musculoskeletal: Normal range of motion and neck supple  Cardiovascular:      Rate and Rhythm: Normal rate  Heart sounds: Murmur present  Pulmonary:      Effort: Pulmonary effort is normal       Comments: 4L NC SpO2 94%  Scattered rhonchi  Abdominal:      General: Bowel sounds are normal  There is no distension  Palpations: Abdomen is soft  Tenderness: There is no abdominal tenderness  Musculoskeletal: Normal range of motion  General: Swelling present  Skin:     General: Skin is warm and dry  Comments: B/l +1 pedal edema   Neurological:      General: No focal deficit present  Mental Status: He is oriented to person, place, and time  Mental status is at baseline  Cranial Nerves: No cranial nerve deficit     Psychiatric:         Mood and Affect: Mood normal          Laboratory and Diagnostics:  Results from last 7 days   Lab Units 05/17/21  0419 05/16/21  1014 05/16/21  0502 05/15/21  2022 05/15/21  1211 05/15/21  0403 05/14/21  0408 05/13/21  0503 05/12/21  0329 05/11/21  0832   WBC Thousand/uL 15 29*  --  18 36*  --   --  19 18* 25 06* 16 61* 17 80* 14 73*   HEMOGLOBIN g/dL 7 6*  --  7 4* 7 5* 6 7* 7 0* 8 0* 7 8* 8 2* 8 1*   HEMATOCRIT % 23 8*  --  23 6*  --   --  23 9* 26 5* 26 5* 27 4* 27 3*   PLATELETS Thousands/uL 207 289 241  --   --  287 389 280 327 191   NEUTROS PCT %  --   --   --   --   --   --   --  89* 88* 81*   BANDS PCT %  --   --   --   --   --   --  1  --   --   --    MONOS PCT % --   --   --   --   --   --   --  3* 4 7   MONO PCT %  --   --   --   --   --   --  6  --   --   --      Results from last 7 days   Lab Units 05/17/21  0903 05/17/21  0420 05/16/21  0502 05/15/21  0403 05/14/21  0408 05/13/21  0503 05/12/21  0329 05/11/21  0832   SODIUM mmol/L 133* 134* 137 140 139 142 142 142   POTASSIUM mmol/L 5 4* 5 5* 4 5 4 3 4 2 3 7 4 2 3 6   CHLORIDE mmol/L 96* 97* 99* 99* 98* 98* 101 101   CO2 mmol/L 33* 30 36* 36* 36* 38* 31 37*   ANION GAP mmol/L 4 7 2* 5 5 6 10 4   BUN mg/dL 43* 46* 49* 43* 36* 29* 27* 30*   CREATININE mg/dL 1 21 1 26 1 26 1 12 1 07 0 95 0 96 1 07   CALCIUM mg/dL 8 2* 7 8* 7 3* 7 7* 7 9* 7 6* 7 9* 7 9*   GLUCOSE RANDOM mg/dL 298* 194* 202* 86 142* 246* 220* 164*   ALT U/L  --   --   --   --   --  31 33 38   AST U/L  --   --   --   --   --  18 18 23   ALK PHOS U/L  --   --   --   --   --  139* 142* 120*   ALBUMIN g/dL  --   --   --   --   --  2 5* 2 4* 2 0*   TOTAL BILIRUBIN mg/dL  --   --   --   --   --  0 80 0 90 0 80     Results from last 7 days   Lab Units 05/13/21  0503   MAGNESIUM mg/dL 2 3      Results from last 7 days   Lab Units 05/16/21  1014   INR  1 12   PTT seconds 27              ABG:    VBG:    Results from last 7 days   Lab Units 05/16/21  0502 05/15/21  0403 05/13/21  0503 05/12/21  1139   PROCALCITONIN ng/ml 0 07 0 10 0 16 0 13       Micro  Results from last 7 days   Lab Units 05/15/21  1305   GRAM STAIN RESULT  Rare Polys  No bacteria seen         Imaging: I have personally reviewed pertinent reports  Intake and Output  I/O       05/16 0701 - 05/17 0700 05/17 0701 - 05/18 0700    P  O  1680 1442    Total Intake(mL/kg) 1680 (24) 1442 (20 6)    Urine (mL/kg/hr) 3050 (1 8) 4175 (2 5)    Stool  0    Total Output 3050 4175    Net -1370 -2733          Unmeasured Stool Occurrence  2 x          Height and Weights   Height: 5' 11" (180 3 cm)  IBW (Ideal Body Weight): 75 3 kg  Body mass index is 21 55 kg/m²    Weight (last 2 days)     Date/Time   Weight 05/17/21 0414   70 1 (154 54)    05/16/21 0546   69 9 (154 1)                Nutrition       Diet Orders   (From admission, onward)             Start     Ordered    05/16/21 0949  Diet Shaw/CHO Controlled; Consistent Carbohydrate Diet Level 1 (4 carb servings/60 grams CHO/meal)  Diet effective now     Question Answer Comment   Diet Type Shaw/CHO Controlled    Shaw/CHO Controlled Consistent Carbohydrate Diet Level 1 (4 carb servings/60 grams CHO/meal)    RD to adjust diet per protocol?  Yes        05/16/21 0949                  Active Medications  Scheduled Meds:  Current Facility-Administered Medications   Medication Dose Route Frequency Provider Last Rate    acetaminophen  650 mg Oral Q6H PRN Michelle Muse, CRNP      aspirin  81 mg Oral Daily Michelle Muse, CRNP      atorvastatin  40 mg Oral Daily With [address], CRNP      cholecalciferol  2,000 Units Oral Daily Michelle Muse, CRNP      ferrous sulfate  325 mg Oral Daily With Breakfast Christian Valdovinos, CRNP      furosemide  40 mg Intravenous BID (diuretic) Pau Guy, CRNP      insulin lispro  1-6 Units Subcutaneous HS Debbie Corporal, CRNP      insulin lispro  2-12 Units Subcutaneous TID AC Balbina Benitez, CRNP      insulin lispro  3 Units Subcutaneous TID With Meals Pau Guy, CRNP      melatonin  6 mg Oral HS Michelle Muse, CRNP      methylPREDNISolone sodium succinate  60 mg Intravenous Q12H [address] Balbina Benitez, CRNP      metoprolol succinate  100 mg Oral Daily Michelle Muse, CRNP      multivitamin-minerals  1 tablet Oral Daily Michelle Muse, CRNP      nitroglycerin  0 4 mg Sublingual Q5 Min PRN Michelle Muse, CRNP      ondansetron  4 mg Intravenous Q6H PRN Michelle Muse, CRNP      pantoprazole  40 mg Oral Early Morning Michelle Muse, CRNP      phenol  1 spray Mouth/Throat Q2H PRN Smita A Sedora, CRNP      polyethylene glycol  17 g Oral Daily PRN Neita Leather, CRNP      saccharomyces boulardii  250 mg Oral BID Kal Pilar DUNG Flanagan      simethicone  80 mg Oral Q6H PRN DUNG Pedraza      sodium chloride  2 spray Each Nare Q1H PRN DUNG Tipton      sulfamethoxazole-trimethoprim  2 tablet Oral Q12H Allison Jerome MD       Continuous Infusions:     PRN Meds:   acetaminophen, 650 mg, Q6H PRN  nitroglycerin, 0 4 mg, Q5 Min PRN  ondansetron, 4 mg, Q6H PRN  phenol, 1 spray, Q2H PRN  polyethylene glycol, 17 g, Daily PRN  simethicone, 80 mg, Q6H PRN  sodium chloride, 2 spray, Q1H PRN        Invasive Devices Review  Invasive Devices     Peripheral Intravenous Line            Peripheral IV 05/16/21 Left Forearm 1 day                Rationale for remaining devices: NA  ---------------------------------------------------------------------------------------  Advance Directive and Living Will:      Power of :    POLST:    ---------------------------------------------------------------------------------------  Care Time Delivered:   No Critical Care time spent       DUNG [place]on      Portions of the record may have been created with voice recognition software  Occasional wrong word or "sound a like" substitutions may have occurred due to the inherent limitations of voice recognition software    Read the chart carefully and recognize, using context, where substitutions have occurred no

## 2022-06-27 NOTE — ED ADULT NURSE NOTE - CAS TRG GEN SKIN COLOR
[phone]  Care Plan: Self monitoring  LOOP Provider: Toldeo/Lima  (BANDA ONLY) Recommended Follow Up: Normal for race

## 2022-06-28 ENCOUNTER — TRANSCRIPTION ENCOUNTER (OUTPATIENT)
Age: 42
End: 2022-06-28

## 2022-06-28 VITALS
RESPIRATION RATE: 20 BRPM | HEART RATE: 86 BPM | TEMPERATURE: 98 F | OXYGEN SATURATION: 95 % | SYSTOLIC BLOOD PRESSURE: 93 MMHG | DIASTOLIC BLOOD PRESSURE: 60 MMHG

## 2022-06-28 LAB
4/8 RATIO: 1.16 RATIO — SIGNIFICANT CHANGE UP (ref 0.9–3.6)
ABS CD8: 290 /UL — SIGNIFICANT CHANGE UP (ref 142–740)
ALBUMIN SERPL ELPH-MCNC: 3.3 G/DL — LOW (ref 3.5–5)
ALP SERPL-CCNC: 45 U/L — SIGNIFICANT CHANGE UP (ref 40–120)
ALT FLD-CCNC: 22 U/L DA — SIGNIFICANT CHANGE UP (ref 10–60)
ANION GAP SERPL CALC-SCNC: 10 MMOL/L — SIGNIFICANT CHANGE UP (ref 5–17)
AST SERPL-CCNC: 15 U/L — SIGNIFICANT CHANGE UP (ref 10–40)
BASOPHILS # BLD AUTO: 0.01 K/UL — SIGNIFICANT CHANGE UP (ref 0–0.2)
BASOPHILS NFR BLD AUTO: 0.1 % — SIGNIFICANT CHANGE UP (ref 0–2)
BILIRUB SERPL-MCNC: 0.3 MG/DL — SIGNIFICANT CHANGE UP (ref 0.2–1.2)
BUN SERPL-MCNC: 15 MG/DL — SIGNIFICANT CHANGE UP (ref 7–18)
CALCIUM SERPL-MCNC: 8.5 MG/DL — SIGNIFICANT CHANGE UP (ref 8.4–10.5)
CD16+CD56+ CELLS NFR BLD: 17 % — SIGNIFICANT CHANGE UP (ref 5–23)
CD16+CD56+ CELLS NFR SPEC: 171 /UL — SIGNIFICANT CHANGE UP (ref 71–410)
CD19 BLASTS SPEC-ACNC: 149 /UL — SIGNIFICANT CHANGE UP (ref 84–469)
CD19 BLASTS SPEC-ACNC: 15 % — SIGNIFICANT CHANGE UP (ref 6–24)
CD3 BLASTS SPEC-ACNC: 662 /UL — LOW (ref 672–1870)
CD3 BLASTS SPEC-ACNC: 67 % — SIGNIFICANT CHANGE UP (ref 59–83)
CD4 %: 35 % — SIGNIFICANT CHANGE UP (ref 30–62)
CD8 %: 30 % — SIGNIFICANT CHANGE UP (ref 12–36)
CHLORIDE SERPL-SCNC: 108 MMOL/L — SIGNIFICANT CHANGE UP (ref 96–108)
CO2 SERPL-SCNC: 20 MMOL/L — LOW (ref 22–31)
CREAT SERPL-MCNC: 0.9 MG/DL — SIGNIFICANT CHANGE UP (ref 0.5–1.3)
EGFR: 82 ML/MIN/1.73M2 — SIGNIFICANT CHANGE UP
EOSINOPHIL # BLD AUTO: 0.12 K/UL — SIGNIFICANT CHANGE UP (ref 0–0.5)
EOSINOPHIL NFR BLD AUTO: 1.3 % — SIGNIFICANT CHANGE UP (ref 0–6)
GLUCOSE SERPL-MCNC: 116 MG/DL — HIGH (ref 70–99)
HCT VFR BLD CALC: 37.3 % — SIGNIFICANT CHANGE UP (ref 34.5–45)
HGB BLD-MCNC: 12.2 G/DL — SIGNIFICANT CHANGE UP (ref 11.5–15.5)
IMM GRANULOCYTES NFR BLD AUTO: 0.3 % — SIGNIFICANT CHANGE UP (ref 0–1.5)
LYMPHOCYTES # BLD AUTO: 1.11 K/UL — SIGNIFICANT CHANGE UP (ref 1–3.3)
LYMPHOCYTES # BLD AUTO: 12.1 % — LOW (ref 13–44)
MAGNESIUM SERPL-MCNC: 2.2 MG/DL — SIGNIFICANT CHANGE UP (ref 1.6–2.6)
MCHC RBC-ENTMCNC: 29.7 PG — SIGNIFICANT CHANGE UP (ref 27–34)
MCHC RBC-ENTMCNC: 32.7 GM/DL — SIGNIFICANT CHANGE UP (ref 32–36)
MCV RBC AUTO: 90.8 FL — SIGNIFICANT CHANGE UP (ref 80–100)
MONOCYTES # BLD AUTO: 0.42 K/UL — SIGNIFICANT CHANGE UP (ref 0–0.9)
MONOCYTES NFR BLD AUTO: 4.6 % — SIGNIFICANT CHANGE UP (ref 2–14)
NEUTROPHILS # BLD AUTO: 7.46 K/UL — HIGH (ref 1.8–7.4)
NEUTROPHILS NFR BLD AUTO: 81.6 % — HIGH (ref 43–77)
NRBC # BLD: 0 /100 WBCS — SIGNIFICANT CHANGE UP (ref 0–0)
PHOSPHATE SERPL-MCNC: 2.3 MG/DL — LOW (ref 2.5–4.5)
PLATELET # BLD AUTO: 306 K/UL — SIGNIFICANT CHANGE UP (ref 150–400)
POTASSIUM SERPL-MCNC: 4.3 MMOL/L — SIGNIFICANT CHANGE UP (ref 3.5–5.3)
POTASSIUM SERPL-SCNC: 4.3 MMOL/L — SIGNIFICANT CHANGE UP (ref 3.5–5.3)
PROT SERPL-MCNC: 7 G/DL — SIGNIFICANT CHANGE UP (ref 6–8.3)
RBC # BLD: 4.11 M/UL — SIGNIFICANT CHANGE UP (ref 3.8–5.2)
RBC # FLD: 14.1 % — SIGNIFICANT CHANGE UP (ref 10.3–14.5)
SODIUM SERPL-SCNC: 138 MMOL/L — SIGNIFICANT CHANGE UP (ref 135–145)
T-CELL CD4 SUBSET PNL BLD: 336 /UL — LOW (ref 489–1457)
TSH SERPL-MCNC: 1.53 UU/ML — SIGNIFICANT CHANGE UP (ref 0.34–4.82)
WBC # BLD: 9.15 K/UL — SIGNIFICANT CHANGE UP (ref 3.8–10.5)
WBC # FLD AUTO: 9.15 K/UL — SIGNIFICANT CHANGE UP (ref 3.8–10.5)

## 2022-06-28 RX ORDER — ALBUTEROL 90 UG/1
2 AEROSOL, METERED ORAL
Qty: 600 | Refills: 0
Start: 2022-06-28 | End: 2022-07-27

## 2022-06-28 RX ORDER — MONTELUKAST 4 MG/1
1 TABLET, CHEWABLE ORAL
Qty: 30 | Refills: 0
Start: 2022-06-28 | End: 2022-07-27

## 2022-06-28 RX ADMIN — BICTEGRAVIR SODIUM, EMTRICITABINE, AND TENOFOVIR ALAFENAMIDE FUMARATE 1 TABLET(S): 30; 120; 15 TABLET ORAL at 11:05

## 2022-06-28 RX ADMIN — ALBUTEROL 1 PUFF(S): 90 AEROSOL, METERED ORAL at 02:10

## 2022-06-28 RX ADMIN — ALBUTEROL 1 PUFF(S): 90 AEROSOL, METERED ORAL at 10:06

## 2022-06-28 RX ADMIN — ALBUTEROL 1 PUFF(S): 90 AEROSOL, METERED ORAL at 05:40

## 2022-06-28 RX ADMIN — ALBUTEROL 1 PUFF(S): 90 AEROSOL, METERED ORAL at 13:57

## 2022-06-28 RX ADMIN — Medication 40 MILLIGRAM(S): at 05:40

## 2022-06-28 RX ADMIN — Medication 112 MICROGRAM(S): at 05:40

## 2022-06-28 NOTE — DISCHARGE NOTE PROVIDER - NSDCCPCAREPLAN_GEN_ALL_CORE_FT
PRINCIPAL DISCHARGE DIAGNOSIS  Diagnosis: Asthma exacerbation  Assessment and Plan of Treatment: You were admitted [facility] due to asthma exacerbation. You were given prednisone and Albuterol inhaler which improved your symptoms. Please continue on medications at home as described and follow up with your PCP.      SECONDARY DISCHARGE DIAGNOSES  Diagnosis: HIV disease  Assessment and Plan of Treatment: Please continue on your home medications as prescribed and follow up outpatient.    Diagnosis: Hypothyroidism  Assessment and Plan of Treatment: Please continue on your home medications as prescribed.

## 2022-06-28 NOTE — DISCHARGE NOTE NURSING/CASE MANAGEMENT/SOCIAL WORK - PATIENT PORTAL LINK FT
You can access the FollowMyHealth Patient Portal offered by [facility] by registering at the following website: [email or web address]. By joining [employer]’s FollowMyHealth portal, you will also be able to view your health information using other applications (apps) compatible with our system.

## 2022-06-28 NOTE — DISCHARGE NOTE NURSING/CASE MANAGEMENT/SOCIAL WORK - NSDCPEFALRISK_GEN_ALL_CORE
For information on Fall & Injury Prevention, visit: [email or web address] OR  [email or web address] OR  https://www.cdc.gov/steadi/patient.html

## 2022-06-28 NOTE — DISCHARGE NOTE PROVIDER - HOSPITAL COURSE
Patient is a 41F, w/ PMH of HIV, asthma, and hypothyroidism, presents with SOB for 3 days. She reports associated increased coughing and wheezing at rest, but denies sputum production. She denies fever, chills, n/v, chest pain, abdominal pain, urinary or bowel movement changes.    In ED, Patient got IV methyl prednisone 125mg and Duoneb. Patient continued on Albuterol inhaler as well. Prednisone 50mg was started daily as well.  Patient symptoms improved. As per attending, Patient is clinically stable to be discharged.

## 2022-06-28 NOTE — PROGRESS NOTE ADULT - SUBJECTIVE AND OBJECTIVE BOX
Patient is a 41y old  Female who presents with a chief complaint of Asthma exac (28 Jun 2022 06:24)    Patient is alert, awake, laying comfortably in the bed in no acute distress. Patient is feeling good today.    INTERVAL HPI/OVERNIGHT EVENTS:      VITAL SIGNS:  T(F): 98.6 (06-28-22 @ 04:38)  HR: 97 (06-28-22 @ 04:38)  BP: 95/55 (06-28-22 @ 04:38)  RR: 20 (06-28-22 @ 04:38)  SpO2: 96% (06-28-22 @ 04:38)  Wt(kg): --  I&O's Detail          REVIEW OF SYSTEMS:    CONSTITUTIONAL:  No fevers, chills, sweats    HEENT:  Eyes:  No diplopia or blurred vision. ENT:  No earache, sore throat or runny nose.    CARDIOVASCULAR:  No pressure, squeezing, tightness, or heaviness about the chest; no palpitations.    RESPIRATORY:  Per HPI    GASTROINTESTINAL:  No abdominal pain, nausea, vomiting or diarrhea.    GENITOURINARY:  No dysuria, frequency or urgency.    NEUROLOGIC:  No paresthesias, fasciculations, seizures or weakness.    PSYCHIATRIC:  No disorder of thought or mood.      PHYSICAL EXAM:    Constitutional: Well developed and nourished  Eyes:Perrla  ENMT: normal  Neck:supple  Respiratory: good air entry  Cardiovascular: S1 S2 regular  Gastrointestinal: Soft, Non tender  Extremities: No edema  Vascular:normal  Neurological:Awake, alert,Ox3  Musculoskeletal:Normal      MEDICATIONS  (STANDING):  ALBUTerol    90 MICROgram(s) HFA Inhaler 1 Puff(s) Inhalation every 4 hours  bictegravir 50 mG/emtricitabine 200 mG/tenofovir alafenamide 25 mG (BIKTARVY) 1 Tablet(s) Oral daily  levothyroxine 112 MICROGram(s) Oral daily  montelukast 10 milliGRAM(s) Oral at bedtime    MEDICATIONS  (PRN):      Allergies    No Known Allergies    Intolerances        LABS:                        12.2   9.15  )-----------( 306      ( 28 Jun 2022 08:09 )             37.3     06-28    138  |  108  |  15  ----------------------------<  116<H>  4.3   |  20<L>  |  0.90    Ca    8.5      28 Jun 2022 08:09  Phos  2.3     06-28  Mg     2.2     06-28    TPro  7.0  /  Alb  3.3<L>  /  TBili  0.3  /  DBili  x   /  AST  15  /  ALT  22  /  AlkPhos  45  06-28              CAPILLARY BLOOD GLUCOSE            RADIOLOGY & ADDITIONAL TESTS:      < from: Xray Chest 1 View-PORTABLE IMMEDIATE (Xray Chest 1 View-PORTABLE IMMEDIATE .) (06.27.22 @ 06:24) >  IMPRESSION:  No focal consolidation.    --- End of Report ---

## 2022-06-28 NOTE — DISCHARGE NOTE PROVIDER - NSDCMRMEDTOKEN_GEN_ALL_CORE_FT
albuterol 90 mcg/inh inhalation aerosol: 2 puff(s) inhaled every 6 to 8 hours   Biktarvy 50 mg-200 mg-25 mg oral tablet: 1 tab(s) orally once a day  montelukast 10 mg oral tablet: 1 tab(s) orally once a day (at bedtime)  predniSONE 10 mg oral tablet: 5 tab orally once a day x 1 days  4 tab orally once a day x 1 days  3 tab orally once a day x 1 days  2 tab orally once a day x 1 days  1 tab orally once a day x 1 days  Synthroid 112 mcg (0.112 mg) oral tablet: 1 tab(s) orally once a day

## 2022-08-11 PROCEDURE — 71045 X-RAY EXAM CHEST 1 VIEW: CPT

## 2022-08-11 PROCEDURE — 86357 NK CELLS TOTAL COUNT: CPT

## 2022-08-11 PROCEDURE — 86355 B CELLS TOTAL COUNT: CPT

## 2022-08-11 PROCEDURE — 84702 CHORIONIC GONADOTROPIN TEST: CPT

## 2022-08-11 PROCEDURE — 96374 THER/PROPH/DIAG INJ IV PUSH: CPT

## 2022-08-11 PROCEDURE — 86359 T CELLS TOTAL COUNT: CPT

## 2022-08-11 PROCEDURE — 84443 ASSAY THYROID STIM HORMONE: CPT

## 2022-08-11 PROCEDURE — 86360 T CELL ABSOLUTE COUNT/RATIO: CPT

## 2022-08-11 PROCEDURE — 94640 AIRWAY INHALATION TREATMENT: CPT

## 2022-08-11 PROCEDURE — 80053 COMPREHEN METABOLIC PANEL: CPT

## 2022-08-11 PROCEDURE — 85025 COMPLETE CBC W/AUTO DIFF WBC: CPT

## 2022-08-11 PROCEDURE — 83735 ASSAY OF MAGNESIUM: CPT

## 2022-08-11 PROCEDURE — 36415 COLL VENOUS BLD VENIPUNCTURE: CPT

## 2022-08-11 PROCEDURE — 99285 EMERGENCY DEPT VISIT HI MDM: CPT | Mod: 25

## 2022-08-11 PROCEDURE — 87635 SARS-COV-2 COVID-19 AMP PRB: CPT

## 2022-08-11 PROCEDURE — 96375 TX/PRO/DX INJ NEW DRUG ADDON: CPT

## 2022-08-11 PROCEDURE — 84100 ASSAY OF PHOSPHORUS: CPT

## 2022-08-11 PROCEDURE — 80048 BASIC METABOLIC PNL TOTAL CA: CPT

## 2022-08-24 NOTE — ED PROVIDER NOTE - HISTORY ATTESTATION, MLM
Patient is a 49 yo F with pshx of L colectomy presented to [facility] ED with 1 day hx of nausea, vomiting, and lower abdominal pain. CT scan showed SBO with transition point on R medial lower abdomen with mild dilation of SB loops, she has been passing flatus and BMs. She was admitted to the colorectal surgery service with a partial SBO. She had a gastrografin challenge and showed contrast in the colon. Through out her hospital stay, she had episodes of diarrhea, but continued to pass flatus and BMs. Her labs and vital signs have been stable.     Patient is tolerating a regular diet, voiding spontaneously, passing flatus and BMs, ambulating, and abdominal pain is well controlled.      Patient is a 49 yo F with pshx of L colectomy presented to [facility] ED with 1 day hx of nausea, vomiting, and lower abdominal pain. CT scan showed SBO with transition point on R medial lower abdomen with mild dilation of SB loops, she has been passing flatus and BMs. She was admitted to the colorectal surgery service with a partial SBO. She had a gastrografin challenge and showed contrast in the colon. Through out her hospital stay, she had episodes of diarrhea, but continued to pass flatus and BMs. Her labs and vital signs have been stable. Patient is tolerating a regular diet, voiding spontaneously, passing flatus and BMs, ambulating, and abdominal pain is well controlled. The patient met all discharge criteria and clear for discharge.     Patient is a 49 yo F with pshx of L colectomy presented to [facility] ED with 1 day hx of nausea, vomiting, and lower abdominal pain. CT scan showed SBO with transition point on R medial lower abdomen with mild dilation of SB loops, she has been passing flatus and BMs. She was admitted to the colorectal surgery service with a partial SBO. She had a gastrografin challenge and showed contrast in the colon. Through out her hospital stay, she had episodes of diarrhea, but continued to pass flatus and BMs. Her labs and vital signs have been stable. Patient is tolerating a regular diet, voiding spontaneously, passing flatus, ambulating, and abdominal pain is well controlled. The patient met all discharge criteria and clear for discharge.     I have reviewed and confirmed nurses' notes...

## 2023-02-22 NOTE — ED PROVIDER NOTE - CARE PLAN
Health Maintenance Due   Topic Date Due   • Shingles Vaccine (1 of 2) Never done   • COVID-19 Vaccine (3 - Booster for Moderna series) 12/02/2021   • Diabetes Foot Exam  01/20/2023         Defer to PCP   Principal Discharge DX:	Acute otitis externa of right ear, unspecified type

## 2024-01-17 NOTE — ED PROVIDER NOTE - CONSTITUTIONAL, MLM
Well appearing, awake, alert, oriented to person, place, time/situation and in no apparent distress. Stable normal...

## 2025-08-02 NOTE — ED PROVIDER NOTE - CONTEXT
Refill Routing Note   Medication(s) are not appropriate for processing by [facility] for the following reason(s):        Required vitals abnormal    [facility] action(s):  Defer               Appointments  past 12m or future 3m with PCP    Date Provider   Last Visit   5/12/2025 Sherri Jay MD   Next Visit   11/4/2025 Sherri Jay MD   ED visits in past 90 days: 0        Note composed:1:31 PM 08/02/2025            unknown